# Patient Record
Sex: MALE | Race: WHITE | NOT HISPANIC OR LATINO | Employment: FULL TIME | URBAN - METROPOLITAN AREA
[De-identification: names, ages, dates, MRNs, and addresses within clinical notes are randomized per-mention and may not be internally consistent; named-entity substitution may affect disease eponyms.]

---

## 2023-04-09 DIAGNOSIS — Z72.0 TOBACCO USE: ICD-10-CM

## 2023-04-10 PROBLEM — M25.522 LEFT ELBOW PAIN: Status: ACTIVE | Noted: 2023-04-10

## 2023-04-10 PROBLEM — E53.8 VITAMIN B12 DEFICIENCY: Status: ACTIVE | Noted: 2023-04-10

## 2023-04-10 PROBLEM — E87.6 HYPOKALEMIA: Status: ACTIVE | Noted: 2023-04-10

## 2023-04-10 PROBLEM — R35.0 URINARY FREQUENCY: Status: ACTIVE | Noted: 2023-04-10

## 2023-04-10 PROBLEM — Z86.0101 HISTORY OF ADENOMATOUS POLYP OF COLON: Status: ACTIVE | Noted: 2023-04-10

## 2023-04-10 PROBLEM — G62.9 PERIPHERAL NEUROPATHY: Status: ACTIVE | Noted: 2023-04-10

## 2023-04-10 PROBLEM — M79.604 PAIN OF RIGHT LOWER EXTREMITY: Status: ACTIVE | Noted: 2023-04-10

## 2023-04-10 PROBLEM — F41.1 GENERALIZED ANXIETY DISORDER: Status: ACTIVE | Noted: 2023-04-10

## 2023-04-10 PROBLEM — M71.122 INFECTION OF LEFT OLECRANON BURSA: Status: RESOLVED | Noted: 2023-04-10 | Resolved: 2023-04-10

## 2023-04-10 PROBLEM — D10.9: Status: ACTIVE | Noted: 2023-04-10

## 2023-04-10 PROBLEM — D64.9 ANEMIA: Status: ACTIVE | Noted: 2023-04-10

## 2023-04-10 PROBLEM — M79.605 PAIN OF LEFT LOWER EXTREMITY: Status: ACTIVE | Noted: 2023-04-10

## 2023-04-10 PROBLEM — Z86.010 HISTORY OF ADENOMATOUS POLYP OF COLON: Status: ACTIVE | Noted: 2023-04-10

## 2023-04-10 PROBLEM — R42 LIGHTHEADEDNESS: Status: ACTIVE | Noted: 2023-04-10

## 2023-04-10 PROBLEM — D72.829 LEUKOCYTOSIS: Status: ACTIVE | Noted: 2023-04-10

## 2023-04-10 PROBLEM — R73.01 IMPAIRED FASTING GLUCOSE: Status: ACTIVE | Noted: 2023-04-10

## 2023-04-10 PROBLEM — K13.70 LESION OF UVULA: Status: ACTIVE | Noted: 2023-04-10

## 2023-04-10 PROBLEM — R20.0 NUMBNESS: Status: ACTIVE | Noted: 2023-04-10

## 2023-04-10 PROBLEM — I25.10 CAD (CORONARY ARTERY DISEASE): Status: ACTIVE | Noted: 2023-04-10

## 2023-04-10 PROBLEM — R91.8 LUNG NODULES: Status: ACTIVE | Noted: 2023-04-10

## 2023-04-10 PROBLEM — E07.9 THYROID DYSFUNCTION: Status: ACTIVE | Noted: 2023-04-10

## 2023-04-10 PROBLEM — R31.21 ASYMPTOMATIC MICROSCOPIC HEMATURIA: Status: ACTIVE | Noted: 2023-04-10

## 2023-04-10 PROBLEM — F41.9 ANXIETY: Status: ACTIVE | Noted: 2023-04-10

## 2023-04-10 PROBLEM — K63.5 COLON POLYP: Status: ACTIVE | Noted: 2023-04-10

## 2023-04-10 PROBLEM — R07.9 CHEST PAIN: Status: ACTIVE | Noted: 2023-04-10

## 2023-04-10 PROBLEM — N40.0 BPH (BENIGN PROSTATIC HYPERPLASIA): Status: ACTIVE | Noted: 2023-04-10

## 2023-04-10 RX ORDER — BUPROPION HYDROCHLORIDE 150 MG/1
150 TABLET ORAL DAILY
COMMUNITY
End: 2024-01-19 | Stop reason: ALTCHOICE

## 2023-04-10 RX ORDER — VARENICLINE TARTRATE 1 MG/1
1 TABLET, FILM COATED ORAL 2 TIMES DAILY
Qty: 56 TABLET | Refills: 1 | Status: SHIPPED | OUTPATIENT
Start: 2023-04-10 | End: 2023-05-01

## 2023-04-10 RX ORDER — BUPROPION HYDROCHLORIDE 150 MG/1
150 TABLET ORAL DAILY
Qty: 90 TABLET | Refills: 2 | Status: SHIPPED | OUTPATIENT
Start: 2023-04-10 | End: 2024-01-19 | Stop reason: ALTCHOICE

## 2023-04-10 RX ORDER — BUSPIRONE HYDROCHLORIDE 7.5 MG/1
7.5 TABLET ORAL 2 TIMES DAILY PRN
COMMUNITY
End: 2024-01-19 | Stop reason: ALTCHOICE

## 2023-04-10 RX ORDER — MULTIVIT-MIN/FOLIC/VIT K/LYCOP 400-300MCG
1 TABLET ORAL DAILY
COMMUNITY

## 2023-04-10 RX ORDER — PREGABALIN 300 MG/1
300 CAPSULE ORAL 2 TIMES DAILY
COMMUNITY
End: 2023-07-25

## 2023-04-10 RX ORDER — ROPINIROLE 0.5 MG/1
TABLET, FILM COATED ORAL
COMMUNITY
Start: 2021-11-23 | End: 2024-01-19 | Stop reason: ALTCHOICE

## 2023-04-10 RX ORDER — ESCITALOPRAM OXALATE 20 MG/1
20 TABLET ORAL DAILY
COMMUNITY
End: 2024-02-07

## 2023-04-10 RX ORDER — ROPINIROLE 1 MG/1
TABLET, FILM COATED ORAL
COMMUNITY
End: 2023-09-25

## 2023-04-30 DIAGNOSIS — Z72.0 TOBACCO USE: ICD-10-CM

## 2023-05-01 RX ORDER — PANTOPRAZOLE SODIUM 40 MG/1
40 TABLET, DELAYED RELEASE ORAL DAILY
COMMUNITY
Start: 2023-04-10 | End: 2024-01-19 | Stop reason: SDUPTHER

## 2023-05-01 RX ORDER — VARENICLINE TARTRATE 1 MG/1
1 TABLET, FILM COATED ORAL 2 TIMES DAILY
Qty: 56 TABLET | Refills: 1 | Status: SHIPPED | OUTPATIENT
Start: 2023-05-01 | End: 2023-05-30

## 2023-05-29 DIAGNOSIS — Z72.0 TOBACCO USE: ICD-10-CM

## 2023-05-30 RX ORDER — VARENICLINE TARTRATE 1 MG/1
1 TABLET, FILM COATED ORAL 2 TIMES DAILY
Qty: 56 TABLET | Refills: 1 | Status: SHIPPED | OUTPATIENT
Start: 2023-05-30 | End: 2023-06-28

## 2023-06-28 DIAGNOSIS — Z72.0 TOBACCO USE: ICD-10-CM

## 2023-06-28 RX ORDER — VARENICLINE TARTRATE 1 MG/1
1 TABLET, FILM COATED ORAL 2 TIMES DAILY
Qty: 56 TABLET | Refills: 1 | Status: SHIPPED | OUTPATIENT
Start: 2023-06-28 | End: 2023-07-19

## 2023-06-28 RX ORDER — SULINDAC 200 MG/1
200 TABLET ORAL 2 TIMES DAILY PRN
COMMUNITY
Start: 2022-08-27 | End: 2024-01-19 | Stop reason: ALTCHOICE

## 2023-07-19 DIAGNOSIS — Z72.0 TOBACCO USE: ICD-10-CM

## 2023-07-19 RX ORDER — VARENICLINE TARTRATE 1 MG/1
1 TABLET, FILM COATED ORAL 2 TIMES DAILY
Qty: 56 TABLET | Refills: 1 | Status: SHIPPED | OUTPATIENT
Start: 2023-07-19 | End: 2023-08-16

## 2023-07-25 ENCOUNTER — TELEPHONE (OUTPATIENT)
Dept: PRIMARY CARE | Facility: CLINIC | Age: 58
End: 2023-07-25
Payer: COMMERCIAL

## 2023-07-25 DIAGNOSIS — G62.9 POLYNEUROPATHY, UNSPECIFIED: ICD-10-CM

## 2023-07-25 RX ORDER — PREGABALIN 300 MG/1
300 CAPSULE ORAL 2 TIMES DAILY
Qty: 180 CAPSULE | Refills: 0 | Status: SHIPPED | OUTPATIENT
Start: 2023-07-25 | End: 2023-10-23 | Stop reason: SDUPTHER

## 2023-08-15 DIAGNOSIS — Z72.0 TOBACCO USE: ICD-10-CM

## 2023-08-16 RX ORDER — VARENICLINE TARTRATE 1 MG/1
1 TABLET, FILM COATED ORAL 2 TIMES DAILY
Qty: 56 TABLET | Refills: 1 | Status: SHIPPED | OUTPATIENT
Start: 2023-08-16 | End: 2023-09-11

## 2023-09-10 DIAGNOSIS — Z72.0 TOBACCO USE: ICD-10-CM

## 2023-09-11 RX ORDER — AMOXICILLIN 875 MG/1
TABLET, FILM COATED ORAL
COMMUNITY
Start: 2023-09-08 | End: 2023-12-05 | Stop reason: ALTCHOICE

## 2023-09-11 RX ORDER — VARENICLINE TARTRATE 1 MG/1
1 TABLET, FILM COATED ORAL 2 TIMES DAILY
Qty: 56 TABLET | Refills: 0 | Status: SHIPPED | OUTPATIENT
Start: 2023-09-11 | End: 2023-10-16

## 2023-09-11 RX ORDER — IBUPROFEN 800 MG/1
TABLET ORAL
COMMUNITY
Start: 2023-09-08 | End: 2024-01-19 | Stop reason: ALTCHOICE

## 2023-09-11 RX ORDER — TRIAZOLAM 0.25 MG/1
TABLET ORAL
COMMUNITY
Start: 2023-09-07 | End: 2024-01-19 | Stop reason: ALTCHOICE

## 2023-09-11 RX ORDER — CHLORHEXIDINE GLUCONATE ORAL RINSE 1.2 MG/ML
SOLUTION DENTAL
COMMUNITY
Start: 2023-09-08 | End: 2024-01-19 | Stop reason: ALTCHOICE

## 2023-09-24 DIAGNOSIS — G62.9 POLYNEUROPATHY, UNSPECIFIED: ICD-10-CM

## 2023-09-24 DIAGNOSIS — Z72.0 TOBACCO USE: ICD-10-CM

## 2023-09-25 RX ORDER — VARENICLINE TARTRATE 1 MG/1
1 TABLET, FILM COATED ORAL 2 TIMES DAILY
Qty: 56 TABLET | Refills: 0 | OUTPATIENT
Start: 2023-09-25

## 2023-09-25 RX ORDER — ROPINIROLE 1 MG/1
TABLET, FILM COATED ORAL
Qty: 90 TABLET | Refills: 1 | Status: SHIPPED | OUTPATIENT
Start: 2023-09-25 | End: 2023-10-06

## 2023-10-06 DIAGNOSIS — G62.9 POLYNEUROPATHY, UNSPECIFIED: ICD-10-CM

## 2023-10-06 RX ORDER — ROPINIROLE 1 MG/1
1 TABLET, FILM COATED ORAL NIGHTLY
Qty: 90 TABLET | Refills: 1 | Status: SHIPPED | OUTPATIENT
Start: 2023-10-06 | End: 2024-05-02 | Stop reason: SDUPTHER

## 2023-10-13 DIAGNOSIS — Z72.0 TOBACCO USE: ICD-10-CM

## 2023-10-16 RX ORDER — VARENICLINE TARTRATE 1 MG/1
1 TABLET, FILM COATED ORAL 2 TIMES DAILY
Qty: 56 TABLET | Refills: 1 | Status: SHIPPED | OUTPATIENT
Start: 2023-10-16 | End: 2024-01-19 | Stop reason: SDUPTHER

## 2023-10-20 DIAGNOSIS — G62.9 POLYNEUROPATHY, UNSPECIFIED: ICD-10-CM

## 2023-10-20 DIAGNOSIS — Z72.0 TOBACCO USE: ICD-10-CM

## 2023-10-23 RX ORDER — VARENICLINE TARTRATE 1 MG/1
1 TABLET, FILM COATED ORAL 2 TIMES DAILY
Qty: 168 TABLET | Refills: 1 | OUTPATIENT
Start: 2023-10-23

## 2023-10-23 RX ORDER — PREGABALIN 300 MG/1
300 CAPSULE ORAL 2 TIMES DAILY
Qty: 180 CAPSULE | Refills: 0 | Status: SHIPPED | OUTPATIENT
Start: 2023-10-23 | End: 2024-01-19 | Stop reason: SDUPTHER

## 2023-10-23 NOTE — TELEPHONE ENCOUNTER
Rx Refill Request Telephone Encounter    Name:  Camacho Thomas  :  [id number]  Medication Name:  pregabalin (Lyrica) 300 mg capsule TAKE 1 CAPSULE BY MOUTH TWICE A DAY - 30 day   PT is out!  Specific Pharmacy location:  [facility] (new)  Date of last appointment:  2023  Date of next appointment:  na  Best number to reach patient:  [phone]

## 2023-11-29 ENCOUNTER — TELEPHONE (OUTPATIENT)
Dept: PRIMARY CARE | Facility: CLINIC | Age: 58
End: 2023-11-29
Payer: COMMERCIAL

## 2023-11-29 DIAGNOSIS — F17.200 TOBACCO DEPENDENCE: ICD-10-CM

## 2023-12-01 RX ORDER — VARENICLINE TARTRATE 0.5 (11)-1
KIT ORAL 2 TIMES DAILY
COMMUNITY
End: 2023-12-01 | Stop reason: SDUPTHER

## 2023-12-01 NOTE — TELEPHONE ENCOUNTER
MEDICATION REFILL     New pharmacy for this Rx    Pharmacy Name:    [facility]  Medication requested            Varenicline  - starter  Dosage    starter dose   Quantity 30 or 90 days  [  ]   Allergies     nka  Date of last visit     Date of Next Appointment   12/7/2023

## 2023-12-02 RX ORDER — VARENICLINE TARTRATE 0.5 (11)-1
0.5 KIT ORAL 2 TIMES DAILY
Qty: 53 EACH | Refills: 1 | Status: SHIPPED | OUTPATIENT
Start: 2023-12-02 | End: 2024-01-19 | Stop reason: ALTCHOICE

## 2024-01-19 ENCOUNTER — OFFICE VISIT (OUTPATIENT)
Dept: PRIMARY CARE | Facility: CLINIC | Age: 59
End: 2024-01-19
Payer: COMMERCIAL

## 2024-01-19 VITALS
WEIGHT: 172 LBS | HEIGHT: 73 IN | SYSTOLIC BLOOD PRESSURE: 106 MMHG | TEMPERATURE: 97.3 F | HEART RATE: 71 BPM | DIASTOLIC BLOOD PRESSURE: 73 MMHG | BODY MASS INDEX: 22.8 KG/M2 | OXYGEN SATURATION: 95 %

## 2024-01-19 DIAGNOSIS — F17.200 TOBACCO USE DISORDER: ICD-10-CM

## 2024-01-19 DIAGNOSIS — F41.1 GENERALIZED ANXIETY DISORDER: ICD-10-CM

## 2024-01-19 DIAGNOSIS — E53.8 VITAMIN B12 DEFICIENCY: ICD-10-CM

## 2024-01-19 DIAGNOSIS — F17.210 CIGARETTE NICOTINE DEPENDENCE WITHOUT COMPLICATION: ICD-10-CM

## 2024-01-19 DIAGNOSIS — F41.9 ANXIETY: ICD-10-CM

## 2024-01-19 DIAGNOSIS — Z12.5 SCREENING FOR PROSTATE CANCER: ICD-10-CM

## 2024-01-19 DIAGNOSIS — G62.9 POLYNEUROPATHY, UNSPECIFIED: ICD-10-CM

## 2024-01-19 DIAGNOSIS — N40.1 BENIGN PROSTATIC HYPERPLASIA WITH URINARY FREQUENCY: ICD-10-CM

## 2024-01-19 DIAGNOSIS — I25.10 CORONARY ARTERY DISEASE INVOLVING NATIVE CORONARY ARTERY OF NATIVE HEART WITHOUT ANGINA PECTORIS: Primary | ICD-10-CM

## 2024-01-19 DIAGNOSIS — R91.8 LUNG NODULES: ICD-10-CM

## 2024-01-19 DIAGNOSIS — R35.0 BENIGN PROSTATIC HYPERPLASIA WITH URINARY FREQUENCY: ICD-10-CM

## 2024-01-19 DIAGNOSIS — Z72.0 TOBACCO USE: ICD-10-CM

## 2024-01-19 DIAGNOSIS — R12 HEARTBURN: ICD-10-CM

## 2024-01-19 DIAGNOSIS — Z79.899 MEDICATION MANAGEMENT: ICD-10-CM

## 2024-01-19 DIAGNOSIS — K21.9 GASTROESOPHAGEAL REFLUX DISEASE WITHOUT ESOPHAGITIS: ICD-10-CM

## 2024-01-19 PROCEDURE — 99407 BEHAV CHNG SMOKING > 10 MIN: CPT | Performed by: FAMILY MEDICINE

## 2024-01-19 PROCEDURE — 99214 OFFICE O/P EST MOD 30 MIN: CPT | Performed by: FAMILY MEDICINE

## 2024-01-19 PROCEDURE — G0296 VISIT TO DETERM LDCT ELIG: HCPCS | Performed by: FAMILY MEDICINE

## 2024-01-19 RX ORDER — PREGABALIN 300 MG/1
300 CAPSULE ORAL 2 TIMES DAILY
Qty: 180 CAPSULE | Refills: 1 | Status: SHIPPED | OUTPATIENT
Start: 2024-01-19

## 2024-01-19 RX ORDER — PREGABALIN 300 MG/1
300 CAPSULE ORAL 2 TIMES DAILY
Qty: 180 CAPSULE | Refills: 0 | Status: CANCELLED | OUTPATIENT
Start: 2024-01-19

## 2024-01-19 RX ORDER — PANTOPRAZOLE SODIUM 40 MG/1
40 TABLET, DELAYED RELEASE ORAL DAILY
Qty: 90 TABLET | Refills: 3 | Status: SHIPPED | OUTPATIENT
Start: 2024-01-19

## 2024-01-19 RX ORDER — VARENICLINE TARTRATE 1 MG/1
1 TABLET, FILM COATED ORAL 2 TIMES DAILY
Qty: 56 TABLET | Refills: 1 | Status: SHIPPED | OUTPATIENT
Start: 2024-01-19

## 2024-01-19 ASSESSMENT — ENCOUNTER SYMPTOMS: SHORTNESS OF BREATH: 0

## 2024-01-19 NOTE — PROGRESS NOTES
"Subjective   Patient ID: Camacho Thomas is a 58 y.o. male who presents for medication review. Doing well; states the Chantix has helped with the urge to smoke and has made his cigarettes taste bad; does fine if he is not thinking about it. Four year old Grandson is a huge motivator for pt to quit.      Working on quitting smoking  Chantix has helped  Down to half a pack per day       Anxiety: On Lexapro, controlling current symptoms  Sxs are ok      Peripheral neuropathy: On Lyrica  Does need meds       GERD  needs meds daily , has hiatal hernia     Restless legs ,  taking meds , doing ok with meds   Gets GERD with higher sxs     Had a catheterization in the past some coronary artery disease  Has not been on a statin         Review of Systems   Respiratory:  Negative for shortness of breath.    Cardiovascular:  Negative for chest pain.       Objective   /73   Pulse 71   Temp 36.3 °C (97.3 °F)   Ht 1.854 m (6' 1\")   Wt 78 kg (172 lb)   SpO2 95%   BMI 22.69 kg/m²     Physical Exam  Constitutional:       Appearance: Normal appearance. He is well-developed.   Cardiovascular:      Rate and Rhythm: Normal rate and regular rhythm.      Heart sounds: Normal heart sounds. No murmur heard.  Pulmonary:      Effort: Pulmonary effort is normal.      Breath sounds: Normal breath sounds.   Abdominal:      General: Abdomen is flat.      Palpations: Abdomen is soft.   Neurological:      General: No focal deficit present.      Mental Status: He is alert.   Psychiatric:         Mood and Affect: Mood normal.         Behavior: Behavior normal.         Assessment/Plan   Problem List Items Addressed This Visit             ICD-10-CM    Anxiety F41.9    Relevant Medications    pantoprazole (ProtoNix) 40 mg EC tablet    varenicline (Chantix) 1 mg tablet    Other Relevant Orders    CBC    Comprehensive Metabolic Panel    Lipid Panel    Prostate Specific Antigen, Screen    Drug Screen, Urine With Reflex to Confirmation    CT " lung screening low dose    BPH (benign prostatic hyperplasia) N40.0    Relevant Medications    pantoprazole (ProtoNix) 40 mg EC tablet    varenicline (Chantix) 1 mg tablet    Other Relevant Orders    CBC    Comprehensive Metabolic Panel    Lipid Panel    Prostate Specific Antigen, Screen    Drug Screen, Urine With Reflex to Confirmation    CT lung screening low dose    CAD (coronary artery disease) - Primary I25.10    Relevant Medications    pantoprazole (ProtoNix) 40 mg EC tablet    varenicline (Chantix) 1 mg tablet    Other Relevant Orders    CBC    Comprehensive Metabolic Panel    Lipid Panel    Prostate Specific Antigen, Screen    Drug Screen, Urine With Reflex to Confirmation    CT lung screening low dose    Generalized anxiety disorder F41.1    Relevant Medications    pantoprazole (ProtoNix) 40 mg EC tablet    varenicline (Chantix) 1 mg tablet    Other Relevant Orders    CBC    Comprehensive Metabolic Panel    Lipid Panel    Prostate Specific Antigen, Screen    Drug Screen, Urine With Reflex to Confirmation    CT lung screening low dose    Lung nodules R91.8    Relevant Medications    pantoprazole (ProtoNix) 40 mg EC tablet    varenicline (Chantix) 1 mg tablet    Other Relevant Orders    CBC    Comprehensive Metabolic Panel    Lipid Panel    Prostate Specific Antigen, Screen    Drug Screen, Urine With Reflex to Confirmation    CT lung screening low dose    Vitamin B12 deficiency E53.8    Relevant Medications    pantoprazole (ProtoNix) 40 mg EC tablet    varenicline (Chantix) 1 mg tablet    Other Relevant Orders    CBC    Comprehensive Metabolic Panel    Lipid Panel    Prostate Specific Antigen, Screen    Drug Screen, Urine With Reflex to Confirmation    CT lung screening low dose     Other Visit Diagnoses         Codes    Heartburn     R12    Relevant Medications    pantoprazole (ProtoNix) 40 mg EC tablet    varenicline (Chantix) 1 mg tablet    Other Relevant Orders    CBC    Comprehensive Metabolic Panel     Lipid Panel    Prostate Specific Antigen, Screen    Drug Screen, Urine With Reflex to Confirmation    CT lung screening low dose    Polyneuropathy, unspecified     G62.9    Relevant Medications    pantoprazole (ProtoNix) 40 mg EC tablet    varenicline (Chantix) 1 mg tablet    pregabalin (Lyrica) 300 mg capsule    Other Relevant Orders    CBC    Comprehensive Metabolic Panel    Lipid Panel    Prostate Specific Antigen, Screen    Drug Screen, Urine With Reflex to Confirmation    CT lung screening low dose    Tobacco use     Z72.0    Relevant Medications    pantoprazole (ProtoNix) 40 mg EC tablet    varenicline (Chantix) 1 mg tablet    Other Relevant Orders    CBC    Comprehensive Metabolic Panel    Lipid Panel    Prostate Specific Antigen, Screen    Drug Screen, Urine With Reflex to Confirmation    CT lung screening low dose    Gastroesophageal reflux disease without esophagitis     K21.9    Relevant Medications    pantoprazole (ProtoNix) 40 mg EC tablet    varenicline (Chantix) 1 mg tablet    Other Relevant Orders    CBC    Comprehensive Metabolic Panel    Lipid Panel    Prostate Specific Antigen, Screen    Drug Screen, Urine With Reflex to Confirmation    CT lung screening low dose    Screening for prostate cancer     Z12.5    Relevant Medications    pantoprazole (ProtoNix) 40 mg EC tablet    varenicline (Chantix) 1 mg tablet    Other Relevant Orders    CBC    Comprehensive Metabolic Panel    Lipid Panel    Prostate Specific Antigen, Screen    Drug Screen, Urine With Reflex to Confirmation    CT lung screening low dose    Medication management     Z79.899    Relevant Medications    pantoprazole (ProtoNix) 40 mg EC tablet    varenicline (Chantix) 1 mg tablet    Other Relevant Orders    CBC    Comprehensive Metabolic Panel    Lipid Panel    Prostate Specific Antigen, Screen    Drug Screen, Urine With Reflex to Confirmation    CT lung screening low dose    Tobacco use disorder     F17.200    Relevant Medications     pantoprazole (ProtoNix) 40 mg EC tablet    varenicline (Chantix) 1 mg tablet    Other Relevant Orders    CBC    Comprehensive Metabolic Panel    Lipid Panel    Prostate Specific Antigen, Screen    Drug Screen, Urine With Reflex to Confirmation    CT lung screening low dose    Cigarette nicotine dependence without complication     F17.210    Relevant Medications    pantoprazole (ProtoNix) 40 mg EC tablet    varenicline (Chantix) 1 mg tablet    Other Relevant Orders    CBC    Comprehensive Metabolic Panel    Lipid Panel    Prostate Specific Antigen, Screen    Drug Screen, Urine With Reflex to Confirmation    CT lung screening low dose

## 2024-01-19 NOTE — PATIENT INSTRUCTIONS
Get your blood work as ordered.  You should hear from our office with results whether they are normal are not within a few days.  Please call the office if you do not hear from us.     After you get testing done you will get notified from our office with regards to your results whether they are normal or not.  If you are registered in the electronic health record we will send you information in that system.  If you have any questions or need clarification please feel free to call the office.     GERD  Take prescribed medication as written.  May take TUMS or Rolaids as needed.  No eating within 2 hours of going to bed.  May want to elevate the head of your bed.  Avoid caffeine, chocolate, alcohol, spicy foods, acidic foods, fried foods, mint flavoring.  Call or return to the office if your symptoms change,  worsen, or fail to improve.  It may take 2 weeks for the medication to take effect.    CT screening:  I discussed the patient's smoking history and risk for lung cancer.  I discussed the CT scan for screening.  Including discussion of Harms and benefits of screening, follow-up diagnostic testing, and benefit of early detection.  The CAT scan of the lungs oftentimes will  cancer earlier and may result any better outcome than waiting for a lung cancer becomes symptomatic.  There are sometimes however people that do have false positive tests as well.  I gave the patient order for the CAT scan for screening purposes.  Determined the patient fits in the criteria based on the age range and smoking history to receive the CT        Coronary artery disease:  It is important that you continue your medications as prescribed.  You should be seeing a cardiologist regularly at least once a year sometimes more than that.  It is important that if you have problems with chest pain or shortness of breath that you let your cardiologist know immediately.  It is important to keep your weight down and exercise regularly as these  are good for your heart.  Also, if you are smoker stopped smoking as that increases your risk of heart disease.    The statin class of cholesterol drugs has been shown to have a significant benefit in reduction of heart disease.  The medications reduce the risk of heart attack by 30 % and the risk of death by about 20 %.  The problems with side effects are comparatively very low.  The risk of muscle pain is about 5% in statin use and the risk of liver damage is < 0.06%.  Therefore with these medications the benefit far outweighs the risk and I would recommend you take the medications.      It is however important to get your blood work checked periodically and report any significant increase risk in muscle pains, especially diffuse all over muscle pains.

## 2024-01-24 ENCOUNTER — APPOINTMENT (OUTPATIENT)
Dept: PRIMARY CARE | Facility: CLINIC | Age: 59
End: 2024-01-24
Payer: COMMERCIAL

## 2024-01-30 ENCOUNTER — OFFICE VISIT (OUTPATIENT)
Dept: SURGERY | Facility: CLINIC | Age: 59
End: 2024-01-30
Payer: COMMERCIAL

## 2024-01-30 VITALS
HEIGHT: 74 IN | HEART RATE: 54 BPM | OXYGEN SATURATION: 97 % | WEIGHT: 179 LBS | SYSTOLIC BLOOD PRESSURE: 118 MMHG | DIASTOLIC BLOOD PRESSURE: 79 MMHG | BODY MASS INDEX: 22.97 KG/M2

## 2024-01-30 DIAGNOSIS — R07.89 OTHER CHEST PAIN: Primary | ICD-10-CM

## 2024-01-30 PROCEDURE — 99213 OFFICE O/P EST LOW 20 MIN: CPT | Performed by: SURGERY

## 2024-01-30 NOTE — PROGRESS NOTES
General Surgery History and Physical    Referring Provider:  Karla Holcomb MD  No ref. provider found     Chief Complaint:  Chief Complaint   Patient presents with    New Patient Visit     Eval for hernia        History of Present Illness:  Camacho Thomas is a 58 y.o. male who presents for evaluation of hiatal hernia.   Patient has had on and off chest pain for several years   Is followed by cardiology   Had cardiac cath in July 2021 which was normal per report.   Recently, chest pain recurred and saw Cardiology, who suggested his hiatal hernia needs to be addressed. He was started on protonix 40mg dialy by PCP Dr Holcomb.   Per patient, he had EGD before. He was told that he has HH.   Clinically, he denies heart burn. His chest pain is more like pressure and tightness.   He denies dysphagia or regurgitation.     He had EGD with Dr Araya in 2016, which showed no Raya's on biopsy.   No other EGD or contrast study of esophagus was found in system.     Past Medical History:  Past Medical History:   Diagnosis Date    Other specified health status     No pertinent past medical history    Personal history of other diseases of the circulatory system 05/29/2019    History of mitral valve prolapse        Past Surgical History:  No past surgical history on file.     Medications:  Current Outpatient Medications   Medication Instructions    escitalopram (LEXAPRO) 20 mg, oral, Daily    multivit-min-folic-vit K-lycop (One-A-Day Men's Multivitamin) 400- mcg tablet 1 tablet, oral, Daily    pantoprazole (PROTONIX) 40 mg, oral, Daily    pregabalin (LYRICA) 300 mg, oral, 2 times daily    rOPINIRole (REQUIP) 1 mg, oral, Nightly    varenicline (CHANTIX) 1 mg, oral, 2 times daily        Allergies:  No Known Allergies     Family History:  No family history on file.     Social History:  Social History     Socioeconomic History    Marital status:      Spouse name: Not on file    Number of children: Not on file     Years of education: Not on file    Highest education level: Not on file   Occupational History    Not on file   Tobacco Use    Smoking status: Every Day     Packs/day: .5     Types: Cigarettes    Smokeless tobacco: Never   Substance and Sexual Activity    Alcohol use: Not on file    Drug use: Not on file    Sexual activity: Not on file   Other Topics Concern    Not on file   Social History Narrative    Not on file     Social Determinants of Health     Financial Resource Strain: Not on file   Food Insecurity: Not on file   Transportation Needs: Not on file   Physical Activity: Not on file   Stress: Not on file   Social Connections: Not on file   Intimate Partner Violence: Not on file   Housing Stability: Not on file        Review of Systems:  A complete 12 point review of systems was performed. Please see scanned questionnaire and is otherwise negative except as noted in the history of present illness.    Vital Signs:  Vitals:    01/30/24 1317   BP: 118/79   Pulse: 54   SpO2: 97%      Body mass index is 22.98 kg/m².      Physical Exam:  General: No acute distress.   Neuro: Alert and oriented ×3. Follows commands.  Face: Atraumatic, no visible skin lesion.   Head: Atraumatic  Eyes: Pupils equal reactive to light. Extraocular motions intact.  Ears: Hears normal speaking voice.  Mouth, Nose, Throat: Mucous membranes moist.  Normal dentition.  Neck: Supple. No visible masses.  Breast: Not examined.   Lung: Patent airway and no labored breath.   Vascular: Palpable radial pulses bilaterally.  Abdomen: Soft, NT,ND.  Small UH.   Rectal and Perianal: Not examined.   Genitourinary: Not examined.   Musculoskeletal: Moves all extremities.  Normal range of motion.  Extremities: No cyanosis. No edema.   Lymphatic: No palpable lymph nodes.  Skin: No rashes or lesions.  Psychological: Normal affect      Laboratory Values:  CBC:   Lab Results   Component Value Date    WBC 8.9 09/20/2021    RBC 3.85 (L) 09/20/2021    HGB 12.3 (L)  09/20/2021    HCT 34.2 (L) 09/20/2021     09/20/2021       RFP:   Lab Results   Component Value Date     09/20/2021    K 3.7 09/20/2021     09/20/2021    CO2 27 09/20/2021    BUN 11 09/20/2021    CREATININE 0.95 09/20/2021    CALCIUM 9.0 09/20/2021    MG 1.99 09/20/2021        LFTs:   Lab Results   Component Value Date    PROT 6.6 09/20/2021    ALBUMIN 4.1 09/20/2021    BILITOT 0.9 09/20/2021    ALKPHOS 43 09/20/2021    AST 22 09/20/2021    ALT 15 09/20/2021            Imaging:  I have personally reviewed the images and the radiologist's report.  No results found.      Assessment:  This is a 58 y.o. male who presents for evaluation of HH   History of chronic chest pain   Cardiology believe his HH contributes to his complaint   However, I dont have EGD or contrast study records to confirm the diagnosis     Plan:  Patient is scheduled for have low dose CT chest for lung screening   Explained to patient, I will review his CT after it is done to see if concern for HH. Further imaging or study such as esophagram or EGD may be needed.  Instructed to continue PPI for now.   Call or ED if any concern       CT chest was done on 2/13. I dont see obvious large HH. Called and wife answered. Per wife, patient still has chest pain especially during night, which likely related to reflux. Recommended to get EGD. Patient will call office to schedule procedure.   2/22/24.       Melba Molina MD [facility]  General Surgery  Office: [phone]  Fax:     [phone]  3:44 PM   01/30/24

## 2024-01-30 NOTE — LETTER
January 30, 2024     Karla Holcomb MD  [address]  [address]  [facility] [address]    Patient: Camacho Thomas   YOB: 1965   Date of Visit: 1/30/2024       Dear Dr. Karla Holcomb MD:    Thank you for referring Camacho Thomas to me for evaluation. Below are my notes for this consultation.  If you have questions, please do not hesitate to call me. I look forward to following your patient along with you.       Sincerely,     Melba Molina MD      CC: No Recipients  ______________________________________________________________________________________    General Surgery History and Physical    Referring Provider:  Karla Holcmob MD  No ref. provider found     Chief Complaint:  Chief Complaint   Patient presents with   • New Patient Visit     Eval for hernia        History of Present Illness:  Camacho Thomas is a 58 y.o. male who presents for evaluation of hiatal hernia.   Patient has had on and off chest pain for several years   Is followed by cardiology   Had cardiac cath in July 2021 which was normal per report.   Recently, chest pain recurred and saw Cardiology, who suggested his hiatal hernia needs to be addressed. He was started on protonix 40mg dialy by PCP Dr Holcomb.   Per patient, he had EGD before. He was told that he has HH.   Clinically, he denies heart burn. His chest pain is more like pressure and tightness.   He denies dysphagia or regurgitation.     He had EGD with Dr Araya in 2016, which showed no Raya's on biopsy.   No other EGD or contrast study of esophagus was found in system.     Past Medical History:  Past Medical History:   Diagnosis Date   • Other specified health status     No pertinent past medical history   • Personal history of other diseases of the circulatory system 05/29/2019    History of mitral valve prolapse        Past Surgical History:  No past surgical history on file.     Medications:  Current Outpatient Medications   Medication Instructions   •  escitalopram (LEXAPRO) 20 mg, oral, Daily   • multivit-min-folic-vit K-lycop (One-A-Day Men's Multivitamin) 400- mcg tablet 1 tablet, oral, Daily   • pantoprazole (PROTONIX) 40 mg, oral, Daily   • pregabalin (LYRICA) 300 mg, oral, 2 times daily   • rOPINIRole (REQUIP) 1 mg, oral, Nightly   • varenicline (CHANTIX) 1 mg, oral, 2 times daily        Allergies:  No Known Allergies     Family History:  No family history on file.     Social History:  Social History     Socioeconomic History   • Marital status:      Spouse name: Not on file   • Number of children: Not on file   • Years of education: Not on file   • Highest education level: Not on file   Occupational History   • Not on file   Tobacco Use   • Smoking status: Every Day     Packs/day: .5     Types: Cigarettes   • Smokeless tobacco: Never   Substance and Sexual Activity   • Alcohol use: Not on file   • Drug use: Not on file   • Sexual activity: Not on file   Other Topics Concern   • Not on file   Social History Narrative   • Not on file     Social Determinants of Health     Financial Resource Strain: Not on file   Food Insecurity: Not on file   Transportation Needs: Not on file   Physical Activity: Not on file   Stress: Not on file   Social Connections: Not on file   Intimate Partner Violence: Not on file   Housing Stability: Not on file        Review of Systems:  A complete 12 point review of systems was performed. Please see scanned questionnaire and is otherwise negative except as noted in the history of present illness.    Vital Signs:  Vitals:    01/30/24 1317   BP: 118/79   Pulse: 54   SpO2: 97%      Body mass index is 22.98 kg/m².      Physical Exam:  General: No acute distress.   Neuro: Alert and oriented ×3. Follows commands.  Face: Atraumatic, no visible skin lesion.   Head: Atraumatic  Eyes: Pupils equal reactive to light. Extraocular motions intact.  Ears: Hears normal speaking voice.  Mouth, Nose, Throat: Mucous membranes moist.  Normal  dentition.  Neck: Supple. No visible masses.  Breast: Not examined.   Lung: Patent airway and no labored breath.   Vascular: Palpable radial pulses bilaterally.  Abdomen: Soft, NT,ND.  Small UH.   Rectal and Perianal: Not examined.   Genitourinary: Not examined.   Musculoskeletal: Moves all extremities.  Normal range of motion.  Extremities: No cyanosis. No edema.   Lymphatic: No palpable lymph nodes.  Skin: No rashes or lesions.  Psychological: Normal affect      Laboratory Values:  CBC:   Lab Results   Component Value Date    WBC 8.9 09/20/2021    RBC 3.85 (L) 09/20/2021    HGB 12.3 (L) 09/20/2021    HCT 34.2 (L) 09/20/2021     09/20/2021       RFP:   Lab Results   Component Value Date     09/20/2021    K 3.7 09/20/2021     09/20/2021    CO2 27 09/20/2021    BUN 11 09/20/2021    CREATININE 0.95 09/20/2021    CALCIUM 9.0 09/20/2021    MG 1.99 09/20/2021        LFTs:   Lab Results   Component Value Date    PROT 6.6 09/20/2021    ALBUMIN 4.1 09/20/2021    BILITOT 0.9 09/20/2021    ALKPHOS 43 09/20/2021    AST 22 09/20/2021    ALT 15 09/20/2021            Imaging:  I have personally reviewed the images and the radiologist's report.  No results found.      Assessment:  This is a 58 y.o. male who presents for evaluation of HH   History of chronic chest pain   Cardiology believe his HH contributes to his complaint   However, I dont have EGD or contrast study records to confirm the diagnosis     Plan:  Patient is scheduled for have low dose CT chest for lung screening   Explained to patient, I will review his CT after it is done to see if concern for HH. Further imaging or study such as esophagram or EGD may be needed.  Instructed to continue PPI for now.   Call or ED if any concern       Melba Molina MD [facility]  General Surgery  Office: [phone]  Fax:     [phone]  3:44 PM   01/30/24

## 2024-02-07 DIAGNOSIS — F41.9 ANXIETY: Primary | ICD-10-CM

## 2024-02-07 PROBLEM — G25.81 RESTLESS LEGS SYNDROME: Status: ACTIVE | Noted: 2024-02-07

## 2024-02-07 PROBLEM — I34.1 MITRAL VALVE PROLAPSE: Status: ACTIVE | Noted: 2024-02-07

## 2024-02-07 PROBLEM — J44.9 MILD CHRONIC OBSTRUCTIVE PULMONARY DISEASE (MULTI): Status: ACTIVE | Noted: 2023-02-13

## 2024-02-07 PROBLEM — I21.9 MYOCARDIAL INFARCTION (MULTI): Status: ACTIVE | Noted: 2024-02-07

## 2024-02-07 PROBLEM — N62 HYPERTROPHY OF BREAST: Status: ACTIVE | Noted: 2023-02-07

## 2024-02-07 PROBLEM — F17.210 CIGARETTE SMOKER: Status: ACTIVE | Noted: 2023-02-13

## 2024-02-07 PROBLEM — R22.31 MASS OF RIGHT AXILLA: Status: ACTIVE | Noted: 2024-02-07

## 2024-02-07 RX ORDER — ESCITALOPRAM OXALATE 20 MG/1
20 TABLET ORAL DAILY
Qty: 90 TABLET | Refills: 1 | Status: SHIPPED | OUTPATIENT
Start: 2024-02-07

## 2024-02-12 ENCOUNTER — HOSPITAL ENCOUNTER (OUTPATIENT)
Dept: RADIOLOGY | Facility: HOSPITAL | Age: 59
Discharge: HOME | End: 2024-02-12
Payer: COMMERCIAL

## 2024-02-12 DIAGNOSIS — F17.210 CIGARETTE NICOTINE DEPENDENCE WITHOUT COMPLICATION: ICD-10-CM

## 2024-02-12 DIAGNOSIS — Z72.0 TOBACCO USE: ICD-10-CM

## 2024-02-12 DIAGNOSIS — E53.8 VITAMIN B12 DEFICIENCY: ICD-10-CM

## 2024-02-12 DIAGNOSIS — I25.10 CORONARY ARTERY DISEASE INVOLVING NATIVE CORONARY ARTERY OF NATIVE HEART WITHOUT ANGINA PECTORIS: ICD-10-CM

## 2024-02-12 DIAGNOSIS — Z79.899 MEDICATION MANAGEMENT: ICD-10-CM

## 2024-02-12 DIAGNOSIS — F41.1 GENERALIZED ANXIETY DISORDER: ICD-10-CM

## 2024-02-12 DIAGNOSIS — Z12.5 SCREENING FOR PROSTATE CANCER: ICD-10-CM

## 2024-02-12 DIAGNOSIS — R35.0 BENIGN PROSTATIC HYPERPLASIA WITH URINARY FREQUENCY: ICD-10-CM

## 2024-02-12 DIAGNOSIS — F41.9 ANXIETY: ICD-10-CM

## 2024-02-12 DIAGNOSIS — N40.1 BENIGN PROSTATIC HYPERPLASIA WITH URINARY FREQUENCY: ICD-10-CM

## 2024-02-12 DIAGNOSIS — G62.9 POLYNEUROPATHY, UNSPECIFIED: ICD-10-CM

## 2024-02-12 DIAGNOSIS — F17.200 TOBACCO USE DISORDER: ICD-10-CM

## 2024-02-12 DIAGNOSIS — R91.8 LUNG NODULES: ICD-10-CM

## 2024-02-12 DIAGNOSIS — R12 HEARTBURN: ICD-10-CM

## 2024-02-12 DIAGNOSIS — K21.9 GASTROESOPHAGEAL REFLUX DISEASE WITHOUT ESOPHAGITIS: ICD-10-CM

## 2024-02-12 PROCEDURE — 71271 CT THORAX LUNG CANCER SCR C-: CPT

## 2024-02-13 ENCOUNTER — TELEPHONE (OUTPATIENT)
Dept: PRIMARY CARE | Facility: CLINIC | Age: 59
End: 2024-02-13
Payer: COMMERCIAL

## 2024-02-13 NOTE — TELEPHONE ENCOUNTER
----- Message from Karla Holcomb MD sent at 2/13/2024 11:02 AM EST -----  Lung CT shows stable small benign-appearing nodules, need to quit smoking, repeat 1 year  Does show coronary atherosclerosis, recommend getting blood work as ordered and starting statin medication to reduce your risk of heart attack

## 2024-02-13 NOTE — TELEPHONE ENCOUNTER
Result Communication    Resulted Orders   CT lung screening low dose    Narrative    Interpreted By:  Hernando Barroso,   STUDY:  CT LUNG SCREENING LOW DOSE;  2/12/2024 5:17 pm      INDICATION:  Signs/Symptoms:screen.      COMPARISON:  02/13/2023      ACCESSION NUMBER(S):  [id number]      ORDERING CLINICIAN:  RADHA MATOS      TECHNIQUE:  Helical data acquisition of the chest was obtained without IV  contrast material.  Images were reformatted in axial, coronal, and  sagittal planes.      FINDINGS:  LUNGS AND AIRWAYS:  The trachea and central airways are patent. No endobronchial lesion  is seen.      There is mild paraseptal emphysematous changes with biapical pleural  thickening.There is no focal consolidation, pleural effusion, or  pneumothorax.      There are several subcentimeter parenchymal lung nodules.  There is a stable 4 mm right middle lobe lung nodules. A  There is stable 5 mm right lower lobe parenchymal lung nodule.      There are no suspicious lung nodules.      MEDIASTINUM AND FARZANEH, LOWER NECK AND AXILLA:  The thyroid gland is unremarkable.  No evidence of thoracic lymphadenopathy by CT criteria.  Esophagus appears within normal limits as seen.      HEART AND VESSELS:  The thoracic aorta normal in course and caliber.  Main pulmonary artery and its branches are normal in caliber.  There is moderate coronary artery calcifications.  The cardiac chambers are not enlarged.  There is no pericardial effusion seen.      UPPER ABDOMEN:  The visualized subdiaphragmatic structures demonstrate no remarkable  findings.              CHEST WALL AND OSSEOUS STRUCTURES:  Chest wall is within normal limits.  No acute osseous pathology.There are no suspicious osseous lesions.        Impression    1. There are stable subcentimeter parenchymal lung nodules. Recommend  continued 1 year low-dose chest CT for surveillance.  2.  Mild-to-moderate coronary artery calcifications.          LUNG RADS CATEGORY:  Lung Rad:  Lung-RADS 2 (Benign Appearance or Indolent Behavior)      Recommendation: Continue annual screening with Low Dose Chest CT in  12 months, recommended as per American College [employer]  Guidelines Lung-RADS Version 2022.          MACRO:  None      Signed by: Hernando Barroso 2/13/2024 9:10 AM  Dictation workstation:   [id number]       12:51 PM  Karla Holcomb MD  [facility] [address] Clinical Support Staff  Lung CT shows stable small benign-appearing nodules, need to quit smoking, repeat 1 year  Does show coronary atherosclerosis, recommend getting blood work as ordered and starting statin medication to reduce your risk of heart attack    LVM. CA

## 2024-03-05 ENCOUNTER — APPOINTMENT (OUTPATIENT)
Dept: SURGERY | Facility: CLINIC | Age: 59
End: 2024-03-05
Payer: COMMERCIAL

## 2024-05-02 ENCOUNTER — TELEPHONE (OUTPATIENT)
Dept: PRIMARY CARE | Facility: CLINIC | Age: 59
End: 2024-05-02
Payer: COMMERCIAL

## 2024-05-02 DIAGNOSIS — G62.9 POLYNEUROPATHY, UNSPECIFIED: ICD-10-CM

## 2024-05-02 RX ORDER — ROPINIROLE 1 MG/1
1 TABLET, FILM COATED ORAL NIGHTLY
Qty: 90 TABLET | Refills: 2 | Status: SHIPPED | OUTPATIENT
Start: 2024-05-02

## 2024-05-02 NOTE — TELEPHONE ENCOUNTER
MEDICATION REFILL    Pt states the last few refills were for 1 mg and his previous dose was 2 mg.  He has continued to take the 2 mg and is running out of the Rx in half the time.  Was there a dose change?  Why if yes?    Pharmacy Name:    [facility]  Medication requested             Ropinirole 2 mg tablet   Dosage     1x daily at bedtime  Quantity      30 days   Allergies    none given  Date of last visit    01/19/2024  Date of Next Appointment [  ]

## 2024-06-13 ENCOUNTER — TELEPHONE (OUTPATIENT)
Dept: PRIMARY CARE | Facility: CLINIC | Age: 59
End: 2024-06-13
Payer: COMMERCIAL

## 2024-06-13 DIAGNOSIS — G62.9 POLYNEUROPATHY, UNSPECIFIED: ICD-10-CM

## 2024-06-13 RX ORDER — ROPINIROLE 1 MG/1
TABLET, FILM COATED ORAL
Qty: 180 TABLET | Refills: 1 | Status: SHIPPED | OUTPATIENT
Start: 2024-06-13

## 2024-06-13 NOTE — TELEPHONE ENCOUNTER
MD Princess Yousif MA  Caller: Unspecified (Today,  8:20 AM)  done          Previous Messages    Medication Question (REQUESTING UPDATE TO INSTRUCTIONS)  (Newest Message First)  View All Conversations on this Encounter  Karla Holcomb MD  You58 minutes ago (9:43 AM)       done     You routed conversation to Karla Holcomb MD1 hour ago (9:20 AM)     Bonnie Bourgeois routed conversation to Do [address] Clinical Support Staff2 hours ago (8:23 AM)     Bonnie Bourgeois2 hours ago (8:23 AM)     VG  Pt stated he is only getting 30 ropinirole at a time, but that the Rx is supposed to be 1 - 2 tabs daily as needed. Can an Rx be sent to show this increase of dose so they can fill for more than 30 at a time? (  / [facility])         Note         Camacho Thomas [phone]  Bonnie Bourgeois2 hours ago (8:20 AM)

## 2024-06-13 NOTE — TELEPHONE ENCOUNTER
Pt stated he is only getting 30 ropinirole at a time, but that the Rx is supposed to be 1 - 2 tabs daily as needed. Can an Rx be sent to show this increase of dose so they can fill for more than 30 at a time? (  / [facility])

## 2024-07-19 DIAGNOSIS — G62.9 POLYNEUROPATHY, UNSPECIFIED: ICD-10-CM

## 2024-07-22 RX ORDER — PREGABALIN 300 MG/1
300 CAPSULE ORAL 2 TIMES DAILY
Qty: 180 CAPSULE | Refills: 0 | Status: SHIPPED | OUTPATIENT
Start: 2024-07-22

## 2024-07-22 NOTE — TELEPHONE ENCOUNTER
Rx Refill Request Telephone Encounter    Name:  Camacho Thomas  :  [id number]  Medication Name:  pregabalin (Lyrica) 300 mg capsule Take 1 capsule (300 mg) by mouth 2 times a day. - 90 day    Specific Pharmacy location:  [facility]  Date of last appointment:  2024  Date of next appointment:  na  Best number to reach patient:  [phone]

## 2024-08-06 DIAGNOSIS — F41.9 ANXIETY: ICD-10-CM

## 2024-08-06 RX ORDER — ESCITALOPRAM OXALATE 20 MG/1
20 TABLET ORAL DAILY
Qty: 30 TABLET | Refills: 0 | Status: SHIPPED | OUTPATIENT
Start: 2024-08-06

## 2024-09-06 DIAGNOSIS — F41.9 ANXIETY: ICD-10-CM

## 2024-09-06 RX ORDER — ESCITALOPRAM OXALATE 20 MG/1
20 TABLET ORAL DAILY
Qty: 90 TABLET | Refills: 0 | Status: SHIPPED | OUTPATIENT
Start: 2024-09-06

## 2024-10-10 DIAGNOSIS — G62.9 POLYNEUROPATHY, UNSPECIFIED: ICD-10-CM

## 2024-10-11 RX ORDER — ROPINIROLE 1 MG/1
TABLET, FILM COATED ORAL
Qty: 90 TABLET | Refills: 0 | Status: SHIPPED | OUTPATIENT
Start: 2024-10-11

## 2024-10-17 DIAGNOSIS — G62.9 POLYNEUROPATHY, UNSPECIFIED: ICD-10-CM

## 2024-10-17 NOTE — TELEPHONE ENCOUNTER
Medication Refill Request:       Medication: pregablin 300mg capsule  1 po 2 x day     Qty: 180      LOV: 1/19/2024    NOV: 10/28/2024      Pharmacy: [facility]     Patient has 2 left

## 2024-10-18 RX ORDER — PREGABALIN 300 MG/1
300 CAPSULE ORAL 2 TIMES DAILY
Qty: 180 CAPSULE | Refills: 0 | Status: SHIPPED | OUTPATIENT
Start: 2024-10-18

## 2024-10-28 ENCOUNTER — APPOINTMENT (OUTPATIENT)
Dept: PRIMARY CARE | Facility: CLINIC | Age: 59
End: 2024-10-28
Payer: COMMERCIAL

## 2024-10-28 VITALS
OXYGEN SATURATION: 96 % | BODY MASS INDEX: 23.65 KG/M2 | WEIGHT: 184.31 LBS | HEIGHT: 74 IN | TEMPERATURE: 98 F | DIASTOLIC BLOOD PRESSURE: 71 MMHG | SYSTOLIC BLOOD PRESSURE: 117 MMHG

## 2024-10-28 DIAGNOSIS — K21.9 GASTROESOPHAGEAL REFLUX DISEASE WITHOUT ESOPHAGITIS: ICD-10-CM

## 2024-10-28 DIAGNOSIS — N40.1 BENIGN PROSTATIC HYPERPLASIA WITH URINARY FREQUENCY: ICD-10-CM

## 2024-10-28 DIAGNOSIS — G62.9 POLYNEUROPATHY, UNSPECIFIED: ICD-10-CM

## 2024-10-28 DIAGNOSIS — R91.8 LUNG NODULES: ICD-10-CM

## 2024-10-28 DIAGNOSIS — I25.10 CORONARY ARTERY DISEASE INVOLVING NATIVE CORONARY ARTERY OF NATIVE HEART WITHOUT ANGINA PECTORIS: ICD-10-CM

## 2024-10-28 DIAGNOSIS — E53.8 VITAMIN B12 DEFICIENCY: ICD-10-CM

## 2024-10-28 DIAGNOSIS — Z12.5 SCREENING FOR PROSTATE CANCER: ICD-10-CM

## 2024-10-28 DIAGNOSIS — J44.9 MILD CHRONIC OBSTRUCTIVE PULMONARY DISEASE (MULTI): ICD-10-CM

## 2024-10-28 DIAGNOSIS — L84 CORN OF FOOT: ICD-10-CM

## 2024-10-28 DIAGNOSIS — F17.210 CIGARETTE NICOTINE DEPENDENCE WITHOUT COMPLICATION: ICD-10-CM

## 2024-10-28 DIAGNOSIS — R35.0 BENIGN PROSTATIC HYPERPLASIA WITH URINARY FREQUENCY: ICD-10-CM

## 2024-10-28 DIAGNOSIS — F41.9 ANXIETY: Primary | ICD-10-CM

## 2024-10-28 DIAGNOSIS — F41.1 GENERALIZED ANXIETY DISORDER: ICD-10-CM

## 2024-10-28 PROBLEM — M79.605 PAIN OF LEFT LOWER EXTREMITY: Status: RESOLVED | Noted: 2023-04-10 | Resolved: 2024-10-28

## 2024-10-28 PROBLEM — M25.522 LEFT ELBOW PAIN: Status: RESOLVED | Noted: 2023-04-10 | Resolved: 2024-10-28

## 2024-10-28 PROBLEM — R22.31 MASS OF RIGHT AXILLA: Status: RESOLVED | Noted: 2024-02-07 | Resolved: 2024-10-28

## 2024-10-28 PROBLEM — R07.9 CHEST PAIN: Status: RESOLVED | Noted: 2023-04-10 | Resolved: 2024-10-28

## 2024-10-28 PROBLEM — I21.9 MYOCARDIAL INFARCTION (MULTI): Status: RESOLVED | Noted: 2024-02-07 | Resolved: 2024-10-28

## 2024-10-28 PROBLEM — R20.0 NUMBNESS: Status: RESOLVED | Noted: 2023-04-10 | Resolved: 2024-10-28

## 2024-10-28 PROBLEM — M79.604 PAIN OF RIGHT LOWER EXTREMITY: Status: RESOLVED | Noted: 2023-04-10 | Resolved: 2024-10-28

## 2024-10-28 PROCEDURE — 99214 OFFICE O/P EST MOD 30 MIN: CPT | Performed by: FAMILY MEDICINE

## 2024-10-28 PROCEDURE — 3008F BODY MASS INDEX DOCD: CPT | Performed by: FAMILY MEDICINE

## 2024-10-28 PROCEDURE — 99407 BEHAV CHNG SMOKING > 10 MIN: CPT | Performed by: FAMILY MEDICINE

## 2024-10-28 PROCEDURE — 4004F PT TOBACCO SCREEN RCVD TLK: CPT | Performed by: FAMILY MEDICINE

## 2024-10-28 RX ORDER — ESCITALOPRAM OXALATE 20 MG/1
20 TABLET ORAL DAILY
Qty: 90 TABLET | Refills: 3 | Status: SHIPPED | OUTPATIENT
Start: 2024-10-28

## 2024-10-28 RX ORDER — VARENICLINE TARTRATE 1 MG/1
1 TABLET, FILM COATED ORAL 2 TIMES DAILY
Qty: 56 TABLET | Refills: 1 | Status: SHIPPED | OUTPATIENT
Start: 2024-10-28

## 2024-10-28 RX ORDER — ROPINIROLE 1 MG/1
TABLET, FILM COATED ORAL
Qty: 90 TABLET | Refills: 3 | Status: SHIPPED | OUTPATIENT
Start: 2024-10-28

## 2024-10-28 RX ORDER — PREGABALIN 300 MG/1
300 CAPSULE ORAL 2 TIMES DAILY
Qty: 180 CAPSULE | Refills: 1 | Status: CANCELLED | OUTPATIENT
Start: 2024-10-28

## 2024-10-28 RX ORDER — PANTOPRAZOLE SODIUM 40 MG/1
40 TABLET, DELAYED RELEASE ORAL DAILY
Qty: 90 TABLET | Refills: 3 | Status: SHIPPED | OUTPATIENT
Start: 2024-10-28

## 2024-10-28 ASSESSMENT — PATIENT HEALTH QUESTIONNAIRE - PHQ9
SUM OF ALL RESPONSES TO PHQ9 QUESTIONS 1 AND 2: 0
2. FEELING DOWN, DEPRESSED OR HOPELESS: NOT AT ALL
1. LITTLE INTEREST OR PLEASURE IN DOING THINGS: NOT AT ALL

## 2024-11-27 DIAGNOSIS — F17.210 CIGARETTE NICOTINE DEPENDENCE WITHOUT COMPLICATION: ICD-10-CM

## 2024-12-01 RX ORDER — VARENICLINE TARTRATE 1 MG/1
1 TABLET, FILM COATED ORAL 2 TIMES DAILY
Qty: 180 TABLET | Refills: 0 | Status: SHIPPED | OUTPATIENT
Start: 2024-12-01

## 2024-12-14 DIAGNOSIS — G62.9 POLYNEUROPATHY, UNSPECIFIED: ICD-10-CM

## 2024-12-16 RX ORDER — ROPINIROLE 1 MG/1
TABLET, FILM COATED ORAL
Qty: 180 TABLET | Refills: 2 | Status: SHIPPED | OUTPATIENT
Start: 2024-12-16

## 2025-01-20 ENCOUNTER — TELEPHONE (OUTPATIENT)
Dept: PRIMARY CARE | Facility: CLINIC | Age: 60
End: 2025-01-20
Payer: COMMERCIAL

## 2025-01-20 DIAGNOSIS — G62.9 POLYNEUROPATHY, UNSPECIFIED: ICD-10-CM

## 2025-01-20 NOTE — TELEPHONE ENCOUNTER
MEDICATION REFILL    Pharmacy Name:    [facility]  Medication requested            Pregabalin    300 mg   Dosage    1 cap 2 x daily  Quantity     90 days    Allergies    none given  Date of last visit   10/28/2024  Date of Next Appointment   04/28/2025

## 2025-01-22 RX ORDER — PREGABALIN 300 MG/1
300 CAPSULE ORAL 2 TIMES DAILY
Qty: 180 CAPSULE | Refills: 0 | Status: SHIPPED | OUTPATIENT
Start: 2025-01-22

## 2025-02-11 ENCOUNTER — TELEMEDICINE (OUTPATIENT)
Dept: PRIMARY CARE | Facility: CLINIC | Age: 60
End: 2025-02-11
Payer: COMMERCIAL

## 2025-02-11 DIAGNOSIS — J06.9 VIRAL UPPER RESPIRATORY TRACT INFECTION: Primary | ICD-10-CM

## 2025-02-11 PROCEDURE — 99214 OFFICE O/P EST MOD 30 MIN: CPT | Performed by: NURSE PRACTITIONER

## 2025-02-11 RX ORDER — PSEUDOEPHEDRINE HYDROCHLORIDE 60 MG/1
60 TABLET ORAL EVERY 6 HOURS PRN
Qty: 12 TABLET | Refills: 0 | Status: SHIPPED | OUTPATIENT
Start: 2025-02-11 | End: 2025-02-14

## 2025-02-11 ASSESSMENT — ENCOUNTER SYMPTOMS
DYSURIA: 0
NAUSEA: 0
HEADACHES: 0
VOMITING: 0
WHEEZING: 0
DIARRHEA: 0
SINUS PAIN: 0
COUGH: 0
SORE THROAT: 0
RHINORRHEA: 1
ABDOMINAL PAIN: 0

## 2025-02-11 NOTE — PROGRESS NOTES
Subjective   Patient ID: Camacho Thomas is a 59 y.o. male who presents for a Virtual Visit URI.    URI   This is a new problem. The current episode started yesterday. The problem has been gradually worsening. There has been no fever. Associated symptoms include congestion and rhinorrhea. Pertinent negatives include no abdominal pain, chest pain, coughing, diarrhea, dysuria, ear pain, headaches, joint pain, nausea, plugged ear sensation, sinus pain, sneezing, sore throat, vomiting or wheezing. Treatments tried: DayQuil. The treatment provided mild relief.       Review of Systems   HENT:  Positive for congestion and rhinorrhea. Negative for ear pain, sinus pain, sneezing and sore throat.    Respiratory:  Negative for cough and wheezing.    Cardiovascular:  Negative for chest pain.   Gastrointestinal:  Negative for abdominal pain, diarrhea, nausea and vomiting.   Genitourinary:  Negative for dysuria.   Musculoskeletal:  Negative for joint pain.   Neurological:  Negative for headaches.       Physical Exam not performed  E-visit questionnaire reviewed and discussed with patient.   All questions answered    Assessment/Plan   Problem List Items Addressed This Visit    None  Visit Diagnoses         Codes    Viral upper respiratory tract infection    -  Primary J06.9    Relevant Medications    pseudoephedrine (Sudafed) 60 mg tablet        Obtain Influenza, Covid testing  Follow up with PCP for new or worsening symptoms  Discussed with patient   Verbalized understanding, Teach back utilized

## 2025-02-11 NOTE — PATIENT INSTRUCTIONS
A prescription was sent to your pharmacy. Your pharmacist can answer any questions or concerns you may have      Obtain Influenza, Covid testing    Follow up with PCP for new or worsening symptoms      Common Cold  Usually starts 3-5 days after exposure and lasts about 10 days with day 3-5 the worst.   The cough may linger a little longer  Symptoms include Sneezing, Stuffy or Runny Nose, Sore Throat, Cough from post nasal drip, Watery Eyes, Sometimes fever    Treatment  Tylenol for aches and pains, fever  Children under 3 months should not take Tylenol, under 6 months should not take Ibuprofen or if dehydrated  Warm salt water gargles for throat discomfort  Lots of Fluids such as tea with honey, soup, broth, water, Electrolyte replacement drinks  Rest      Preventing Infection    Wash your hands. Wash your hands thoroughly and often with soap and water for at least 20 seconds. If soap and water aren't available, use an alcohol-based hand  that contains at least 60% alcohol. Teach your children the importance of hand-washing. Avoid touching your eyes, nose or mouth with unwashed hands.    Disinfect your stuff. Clean and disinfect high-touch surfaces, such as doorknobs, light switches, electronics, and kitchen and bathroom countertops daily. This is especially important when someone in your family has a cold. Wash children's toys periodically.    Cover your cough. Sneeze and cough into tissues. Throw away used tissues right away, then wash your hands thoroughly. If you don't have a tissue, sneeze or cough into the bend of your elbow and then wash your hands.    Don't share. Don't share drinking glasses or eating utensils with other family members. Use your own glass or disposable cups when you or someone else is sick. Label the cup or glass with the name of the person using it.    Stay away from people with colds. Avoid close contact with anyone who has a cold. Stay out of crowds, when possible. Avoid  touching your eyes, nose and mouth.  Review your  center's policies. Look for a  setting with good hygiene practices and clear policies about keeping sick children at home.    Take care of yourself. Eating well and getting exercise and enough sleep is good for your overall health.

## 2025-02-14 ENCOUNTER — TELEPHONE (OUTPATIENT)
Dept: RADIOLOGY | Facility: HOSPITAL | Age: 60
End: 2025-02-14
Payer: COMMERCIAL

## 2025-02-23 ENCOUNTER — TELEMEDICINE (OUTPATIENT)
Dept: PRIMARY CARE | Facility: CLINIC | Age: 60
End: 2025-02-23
Payer: COMMERCIAL

## 2025-02-23 DIAGNOSIS — J32.9 RHINOSINUSITIS: Primary | ICD-10-CM

## 2025-02-23 PROCEDURE — 99213 OFFICE O/P EST LOW 20 MIN: CPT | Performed by: NURSE PRACTITIONER

## 2025-02-23 RX ORDER — FLUTICASONE PROPIONATE 50 MCG
1 SPRAY, SUSPENSION (ML) NASAL DAILY
Qty: 15.8 ML | Refills: 0 | Status: SHIPPED | OUTPATIENT
Start: 2025-02-23

## 2025-02-23 RX ORDER — AMOXICILLIN AND CLAVULANATE POTASSIUM 875; 125 MG/1; MG/1
1 TABLET, FILM COATED ORAL 2 TIMES DAILY
Qty: 14 TABLET | Refills: 0 | Status: SHIPPED | OUTPATIENT
Start: 2025-02-23 | End: 2025-03-02

## 2025-02-23 ASSESSMENT — ENCOUNTER SYMPTOMS
VOMITING: 0
SHORTNESS OF BREATH: 0
COUGH: 0
DIAPHORESIS: 0
FATIGUE: 1
NAUSEA: 0
HEADACHES: 0
LIGHT-HEADEDNESS: 0
MYALGIAS: 0
FEVER: 0
CHILLS: 0
RHINORRHEA: 1
TROUBLE SWALLOWING: 0
ACTIVITY CHANGE: 0
DIZZINESS: 0
APPETITE CHANGE: 0
SORE THROAT: 0
SINUS PRESSURE: 1

## 2025-02-23 ASSESSMENT — LIFESTYLE VARIABLES: HISTORY_OF_SMOKING: I AM A CURRENT SMOKER

## 2025-02-23 NOTE — PATIENT INSTRUCTIONS
Pleasure meeting with you today!    Let me know if you need anything.     Please send me a MyChart message if you have any questions or concerns.  FOR NON URGENT questions only.  Allow up to 72 hours for response.     If you have prescription issues or other questions you can email   Jaquan Francois,  Digital Health Coordinator, at   [email or web address]

## 2025-02-23 NOTE — PROGRESS NOTES
Subjective   Patient ID: Camacho Thomas is a 59 y.o. male who presents for URI (Sx onset: 2 weeks ago).    Sx improved last week then returned, started worsening over night   Sx today: nasal nose, sinus congestion, facial pressure, discolored drainage, nasal burning  Denies fever, facial pain, cough, HA  02/11/2025 was treated for Viral URI  Assessment/Plan  Problem List Items Addressed This Visit   None  Visit Diagnoses          Codes    Viral upper respiratory tract infection    -  Primary J06.9    Relevant Medications    pseudoephedrine (Sudafed) 60 mg tablet        Obtain Influenza, Covid testing  Follow up with PCP for new or worsening symptoms  Discussed with patient   Verbalized understanding, Teach back utilized      URI   Associated symptoms include congestion and rhinorrhea. Pertinent negatives include no chest pain, coughing, headaches, nausea, sore throat or vomiting.        Review of Systems   Constitutional:  Positive for fatigue. Negative for activity change, appetite change, chills, diaphoresis and fever.   HENT:  Positive for congestion, postnasal drip, rhinorrhea and sinus pressure. Negative for sore throat and trouble swallowing.    Respiratory:  Negative for cough and shortness of breath.    Cardiovascular:  Negative for chest pain.   Gastrointestinal:  Negative for nausea and vomiting.   Musculoskeletal:  Negative for myalgias.   Neurological:  Negative for dizziness, light-headedness and headaches.       Objective   There were no vitals taken for this visit.    Physical Exam  Constitutional:       General: He is not in acute distress.     Appearance: Normal appearance. He is ill-appearing.      Comments: On Demand Virtual Visit Patient Consent     An interactive audio and video telecommunication system which permits real time communications between the patient (at the originating site) and provider (at the distant site) was utilized to provide this telehealth service.   Verbal consent was  requested and obtained from Camacho Thomas (or parent if under 18) on this date, for a telehealth visit.   I have verbally confirmed with Camacho Thomas (or parent if under 18) that they are physically located in the [facility] during this virtual visit.    I performed this visit using realtime telehealth tools, including an audio/video OR telephone connection between the patient listed who was located in the [place] and myself, Roberto Golden CNP (licensed in the [facility]).  At the start of the visit, I introduced myself as Roberto Golden, Nurse practitioner and verified the patients name, , and current physical location.    If they were currently outside of the state of [place], the visit was ended and the patient was referred to alternative means for evaluation and treatment.   The patient was made aware of the limitations of the telehealth visit.  They will not be physically examined and all issues may not be appropriate for a telehealth visit.  If necessary, an in person referral will be made.       DISCLAIMER:   In preparing for this visit and writing this note, I reviewed previous electronic medical records (labs, imaging and medical charts) available.  Significant findings which helped in decision making are recorded in this encounter charting.     Pulmonary:      Effort: Pulmonary effort is normal.   Neurological:      Mental Status: He is alert and oriented to person, place, and time.         Assessment/Plan   Diagnoses and all orders for this visit:  Rhinosinusitis  -     amoxicillin-pot clavulanate (Augmentin) 875-125 mg tablet; Take 1 tablet by mouth 2 times a day for 7 days.  -     fluticasone (Flonase) 50 mcg/actuation nasal spray; Administer 1 spray into each nostril once daily. Shake gently. Before first use, prime pump. After use, clean tip and replace cap.     Complete entire coarse of antibiotics  Follow up with PCP as needed  [facility]

## 2025-04-23 RX ORDER — HYDROXYZINE HYDROCHLORIDE 25 MG/1
1 TABLET, FILM COATED ORAL
COMMUNITY
Start: 2025-04-11

## 2025-04-23 RX ORDER — CHLORHEXIDINE GLUCONATE ORAL RINSE 1.2 MG/ML
SOLUTION DENTAL
COMMUNITY
Start: 2024-11-11

## 2025-04-23 RX ORDER — ATOMOXETINE 40 MG/1
40 CAPSULE ORAL DAILY
COMMUNITY
Start: 2025-04-11

## 2025-04-24 DIAGNOSIS — G62.9 POLYNEUROPATHY, UNSPECIFIED: ICD-10-CM

## 2025-04-25 ENCOUNTER — TELEPHONE (OUTPATIENT)
Dept: PRIMARY CARE | Facility: CLINIC | Age: 60
End: 2025-04-25

## 2025-04-25 RX ORDER — PREGABALIN 300 MG/1
300 CAPSULE ORAL 2 TIMES DAILY
Qty: 180 CAPSULE | Refills: 0 | Status: SHIPPED | OUTPATIENT
Start: 2025-04-25

## 2025-04-25 NOTE — TELEPHONE ENCOUNTER
MED REFILL    [facility]    Pregabalin 300 mg    LOV 10/28/24    NOV 04/28/25

## 2025-04-28 ENCOUNTER — APPOINTMENT (OUTPATIENT)
Dept: PRIMARY CARE | Facility: CLINIC | Age: 60
End: 2025-04-28
Payer: COMMERCIAL

## 2025-05-30 LAB
ALBUMIN SERPL-MCNC: 4.3 G/DL (ref 3.6–5.1)
ALP SERPL-CCNC: 57 U/L (ref 35–144)
ALT SERPL-CCNC: 19 U/L (ref 9–46)
ANION GAP SERPL CALCULATED.4IONS-SCNC: 7 MMOL/L (CALC) (ref 7–17)
AST SERPL-CCNC: 29 U/L (ref 10–35)
BASOPHILS # BLD AUTO: 60 CELLS/UL (ref 0–200)
BASOPHILS NFR BLD AUTO: 0.7 %
BILIRUB SERPL-MCNC: 0.8 MG/DL (ref 0.2–1.2)
BUN SERPL-MCNC: 12 MG/DL (ref 7–25)
CALCIUM SERPL-MCNC: 8.9 MG/DL (ref 8.6–10.3)
CHLORIDE SERPL-SCNC: 105 MMOL/L (ref 98–110)
CHOLEST SERPL-MCNC: 112 MG/DL
CHOLEST/HDLC SERPL: 3 (CALC)
CO2 SERPL-SCNC: 28 MMOL/L (ref 20–32)
CREAT SERPL-MCNC: 0.82 MG/DL (ref 0.7–1.35)
EGFRCR SERPLBLD CKD-EPI 2021: 101 ML/MIN/1.73M2
EOSINOPHIL # BLD AUTO: 162 CELLS/UL (ref 15–500)
EOSINOPHIL NFR BLD AUTO: 1.9 %
ERYTHROCYTE [DISTWIDTH] IN BLOOD BY AUTOMATED COUNT: 12.2 % (ref 11–15)
GLUCOSE SERPL-MCNC: 89 MG/DL (ref 65–99)
HCT VFR BLD AUTO: 39.2 % (ref 38.5–50)
HDLC SERPL-MCNC: 37 MG/DL
HGB BLD-MCNC: 13.4 G/DL (ref 13.2–17.1)
LDLC SERPL CALC-MCNC: 60 MG/DL (CALC)
LYMPHOCYTES # BLD AUTO: 1615 CELLS/UL (ref 850–3900)
LYMPHOCYTES NFR BLD AUTO: 19 %
MCH RBC QN AUTO: 31.5 PG (ref 27–33)
MCHC RBC AUTO-ENTMCNC: 34.2 G/DL (ref 32–36)
MCV RBC AUTO: 92 FL (ref 80–100)
MONOCYTES # BLD AUTO: 587 CELLS/UL (ref 200–950)
MONOCYTES NFR BLD AUTO: 6.9 %
NEUTROPHILS # BLD AUTO: 6078 CELLS/UL (ref 1500–7800)
NEUTROPHILS NFR BLD AUTO: 71.5 %
NONHDLC SERPL-MCNC: 75 MG/DL (CALC)
PLATELET # BLD AUTO: 197 THOUSAND/UL (ref 140–400)
PMV BLD REES-ECKER: 12.1 FL (ref 7.5–12.5)
POTASSIUM SERPL-SCNC: 4.3 MMOL/L (ref 3.5–5.3)
PROT SERPL-MCNC: 7 G/DL (ref 6.1–8.1)
PSA SERPL-MCNC: 0.29 NG/ML
RBC # BLD AUTO: 4.26 MILLION/UL (ref 4.2–5.8)
SODIUM SERPL-SCNC: 140 MMOL/L (ref 135–146)
TRIGL SERPL-MCNC: 72 MG/DL
WBC # BLD AUTO: 8.5 THOUSAND/UL (ref 3.8–10.8)

## 2025-06-02 ENCOUNTER — APPOINTMENT (OUTPATIENT)
Dept: PRIMARY CARE | Facility: CLINIC | Age: 60
End: 2025-06-02
Payer: COMMERCIAL

## 2025-06-02 VITALS
TEMPERATURE: 97.5 F | HEIGHT: 73 IN | OXYGEN SATURATION: 96 % | BODY MASS INDEX: 23.19 KG/M2 | SYSTOLIC BLOOD PRESSURE: 137 MMHG | WEIGHT: 175 LBS | HEART RATE: 56 BPM | DIASTOLIC BLOOD PRESSURE: 75 MMHG

## 2025-06-02 DIAGNOSIS — F17.210 CIGARETTE NICOTINE DEPENDENCE WITHOUT COMPLICATION: ICD-10-CM

## 2025-06-02 DIAGNOSIS — F41.1 GENERALIZED ANXIETY DISORDER: ICD-10-CM

## 2025-06-02 DIAGNOSIS — R35.0 BENIGN PROSTATIC HYPERPLASIA WITH URINARY FREQUENCY: ICD-10-CM

## 2025-06-02 DIAGNOSIS — J44.9 MILD CHRONIC OBSTRUCTIVE PULMONARY DISEASE (MULTI): ICD-10-CM

## 2025-06-02 DIAGNOSIS — G62.9 POLYNEUROPATHY, UNSPECIFIED: ICD-10-CM

## 2025-06-02 DIAGNOSIS — G25.81 RESTLESS LEGS SYNDROME: Primary | ICD-10-CM

## 2025-06-02 DIAGNOSIS — N40.1 BENIGN PROSTATIC HYPERPLASIA WITH URINARY FREQUENCY: ICD-10-CM

## 2025-06-02 DIAGNOSIS — F41.9 ANXIETY: ICD-10-CM

## 2025-06-02 DIAGNOSIS — R91.8 LUNG NODULES: ICD-10-CM

## 2025-06-02 PROBLEM — R73.01 IMPAIRED FASTING GLUCOSE: Status: RESOLVED | Noted: 2023-04-10 | Resolved: 2025-06-02

## 2025-06-02 PROBLEM — R42 LIGHTHEADEDNESS: Status: RESOLVED | Noted: 2023-04-10 | Resolved: 2025-06-02

## 2025-06-02 PROBLEM — E87.6 HYPOKALEMIA: Status: RESOLVED | Noted: 2023-04-10 | Resolved: 2025-06-02

## 2025-06-02 PROBLEM — D72.829 LEUKOCYTOSIS: Status: RESOLVED | Noted: 2023-04-10 | Resolved: 2025-06-02

## 2025-06-02 PROCEDURE — 99214 OFFICE O/P EST MOD 30 MIN: CPT | Performed by: FAMILY MEDICINE

## 2025-06-02 PROCEDURE — 3008F BODY MASS INDEX DOCD: CPT | Performed by: FAMILY MEDICINE

## 2025-06-02 PROCEDURE — 4004F PT TOBACCO SCREEN RCVD TLK: CPT | Performed by: FAMILY MEDICINE

## 2025-06-02 RX ORDER — ROPINIROLE 1 MG/1
1 TABLET, FILM COATED ORAL 3 TIMES DAILY
Qty: 270 TABLET | Refills: 3 | Status: SHIPPED | OUTPATIENT
Start: 2025-06-02

## 2025-06-02 RX ORDER — VARENICLINE TARTRATE 1 MG/1
1 TABLET, FILM COATED ORAL 2 TIMES DAILY
Qty: 180 TABLET | Refills: 0 | Status: SHIPPED | OUTPATIENT
Start: 2025-06-02

## 2025-06-02 RX ORDER — ESCITALOPRAM OXALATE 20 MG/1
20 TABLET ORAL DAILY
Qty: 90 TABLET | Refills: 3 | Status: SHIPPED | OUTPATIENT
Start: 2025-06-02

## 2025-06-02 RX ORDER — PREGABALIN 300 MG/1
300 CAPSULE ORAL 2 TIMES DAILY
Qty: 180 CAPSULE | Refills: 0 | Status: CANCELLED | OUTPATIENT
Start: 2025-06-02

## 2025-06-02 ASSESSMENT — ENCOUNTER SYMPTOMS: SHORTNESS OF BREATH: 1

## 2025-06-02 NOTE — PROGRESS NOTES
"Subjective   Patient ID: Camacho Thomas is a 59 y.o. male who presents for Follow-up. States he has been taking two of the 1 mg Ropinirole and would like a dose increase. Had his labs done recently.         Occ cheating on the smoking   Chantix helps and tolerated      Anxiety: On Lexapro, controlling current symptoms  Sxs are ok       Peripheral neuropathy: On Lyrica  Does need meds ., feet are sensitive      GERD  needs meds daily , has hiatal hernia      Restless legs ,  taking meds , doing ok with meds   Recent blood work was ok       Copd :  breathing is ok   No SHORTNESS OF BREATH   ,no CP           Review of Systems   Respiratory:  Positive for shortness of breath.    Cardiovascular:  Negative for chest pain.       Objective   /75   Pulse 56   Temp 36.4 °C (97.5 °F)   Ht 1.854 m (6' 1\")   Wt 79.4 kg (175 lb)   SpO2 96%   BMI 23.09 kg/m²     Physical Exam  Constitutional:       Appearance: Normal appearance. He is well-developed.   Cardiovascular:      Rate and Rhythm: Normal rate and regular rhythm.      Heart sounds: Normal heart sounds. No murmur heard.  Pulmonary:      Effort: Pulmonary effort is normal.      Breath sounds: Normal breath sounds.   Abdominal:      General: Abdomen is flat.      Palpations: Abdomen is soft.   Neurological:      General: No focal deficit present.      Mental Status: He is alert and oriented to person, place, and time.   Psychiatric:         Mood and Affect: Mood normal.         Assessment/Plan   Problem List Items Addressed This Visit           ICD-10-CM    RESOLVED: Anxiety F41.9    Relevant Medications    escitalopram (Lexapro) 20 mg tablet    BPH (benign prostatic hyperplasia) N40.0    Generalized anxiety disorder F41.1    Lung nodules R91.8    Restless legs syndrome - Primary G25.81     Other Visit Diagnoses         Codes      Polyneuropathy, unspecified     G62.9    Relevant Medications    rOPINIRole (Requip) 1 mg tablet      Cigarette nicotine dependence " without complication     F17.210    Relevant Medications    varenicline tartrate (Chantix) 1 mg tablet

## 2025-06-02 NOTE — PATIENT INSTRUCTIONS
Restless legs ,  increase to 3 mg daily       Continue Lyrica     Anxiety :  For your anxiety is important that you do activities that contribute to relaxation.  Regular exercise and adequate sleep are very important.  Counseling can be beneficial as well.  If you are  on medications for anxiety is important that you take them as directed and let your physician know if you continue to have symptoms or if your symptoms worsen.  It is also important that you be seen in the office on a regular basis at least every 6 months.      CT screening:  I discussed the patient's smoking history and risk for lung cancer.  I discussed the CT scan for screening.  Including discussion of Harms and benefits of screening, follow-up diagnostic testing, and benefit of early detection.  The CAT scan of the lungs oftentimes will  cancer earlier and may result any better outcome than waiting for a lung cancer becomes symptomatic.  There are sometimes however people that do have false positive tests as well.  I gave the patient order for the CAT scan for screening purposes.  Determined the patient fits in the criteria based on the age range and smoking history to receive the CT     Order is in computer :  please schedule CT     Labs reviewed with the patient

## 2025-06-26 ENCOUNTER — APPOINTMENT (OUTPATIENT)
Dept: RADIOLOGY | Facility: HOSPITAL | Age: 60
DRG: 281 | End: 2025-06-26
Payer: COMMERCIAL

## 2025-06-26 ENCOUNTER — APPOINTMENT (OUTPATIENT)
Dept: CARDIOLOGY | Facility: HOSPITAL | Age: 60
DRG: 281 | End: 2025-06-26
Payer: COMMERCIAL

## 2025-06-26 ENCOUNTER — HOSPITAL ENCOUNTER (INPATIENT)
Facility: HOSPITAL | Age: 60
LOS: 1 days | Discharge: HOME | DRG: 281 | End: 2025-06-27
Attending: STUDENT IN AN ORGANIZED HEALTH CARE EDUCATION/TRAINING PROGRAM | Admitting: INTERNAL MEDICINE
Payer: COMMERCIAL

## 2025-06-26 DIAGNOSIS — I21.4 NSTEMI (NON-ST ELEVATED MYOCARDIAL INFARCTION) (MULTI): Primary | ICD-10-CM

## 2025-06-26 DIAGNOSIS — I30.9 ACUTE PERICARDITIS, UNSPECIFIED TYPE (HHS-HCC): ICD-10-CM

## 2025-06-26 LAB
ALBUMIN SERPL BCP-MCNC: 4.1 G/DL (ref 3.4–5)
ALP SERPL-CCNC: 63 U/L (ref 33–136)
ALT SERPL W P-5'-P-CCNC: 16 U/L (ref 10–52)
ANION GAP SERPL CALC-SCNC: 11 MMOL/L (ref 10–20)
APTT PPP: 27 SECONDS (ref 26–36)
AST SERPL W P-5'-P-CCNC: 19 U/L (ref 9–39)
BASOPHILS # BLD AUTO: 0.03 X10*3/UL (ref 0–0.1)
BASOPHILS NFR BLD AUTO: 0.3 %
BILIRUB SERPL-MCNC: 0.7 MG/DL (ref 0–1.2)
BUN SERPL-MCNC: 12 MG/DL (ref 6–23)
CALCIUM SERPL-MCNC: 8.8 MG/DL (ref 8.6–10.3)
CARDIAC TROPONIN I PNL SERPL HS: 26 NG/L (ref 0–20)
CARDIAC TROPONIN I PNL SERPL HS: 32 NG/L (ref 0–20)
CHLORIDE SERPL-SCNC: 102 MMOL/L (ref 98–107)
CO2 SERPL-SCNC: 28 MMOL/L (ref 21–32)
CREAT SERPL-MCNC: 0.93 MG/DL (ref 0.5–1.3)
EGFRCR SERPLBLD CKD-EPI 2021: >90 ML/MIN/1.73M*2
EOSINOPHIL # BLD AUTO: 0.13 X10*3/UL (ref 0–0.7)
EOSINOPHIL NFR BLD AUTO: 1.3 %
ERYTHROCYTE [DISTWIDTH] IN BLOOD BY AUTOMATED COUNT: 12.9 % (ref 11.5–14.5)
GLUCOSE SERPL-MCNC: 112 MG/DL (ref 74–99)
HCT VFR BLD AUTO: 34.1 % (ref 41–52)
HGB BLD-MCNC: 12.2 G/DL (ref 13.5–17.5)
IMM GRANULOCYTES # BLD AUTO: 0.04 X10*3/UL (ref 0–0.7)
IMM GRANULOCYTES NFR BLD AUTO: 0.4 % (ref 0–0.9)
INR PPP: 1.1 (ref 0.9–1.1)
LYMPHOCYTES # BLD AUTO: 2.43 X10*3/UL (ref 1.2–4.8)
LYMPHOCYTES NFR BLD AUTO: 24.9 %
MAGNESIUM SERPL-MCNC: 1.82 MG/DL (ref 1.6–2.4)
MCH RBC QN AUTO: 31.9 PG (ref 26–34)
MCHC RBC AUTO-ENTMCNC: 35.8 G/DL (ref 32–36)
MCV RBC AUTO: 89 FL (ref 80–100)
MONOCYTES # BLD AUTO: 0.73 X10*3/UL (ref 0.1–1)
MONOCYTES NFR BLD AUTO: 7.5 %
NEUTROPHILS # BLD AUTO: 6.38 X10*3/UL (ref 1.2–7.7)
NEUTROPHILS NFR BLD AUTO: 65.6 %
NRBC BLD-RTO: 0 /100 WBCS (ref 0–0)
PLATELET # BLD AUTO: 193 X10*3/UL (ref 150–450)
POTASSIUM SERPL-SCNC: 3.7 MMOL/L (ref 3.5–5.3)
PROT SERPL-MCNC: 7 G/DL (ref 6.4–8.2)
PROTHROMBIN TIME: 11.8 SECONDS (ref 9.8–12.4)
RBC # BLD AUTO: 3.82 X10*6/UL (ref 4.5–5.9)
SODIUM SERPL-SCNC: 137 MMOL/L (ref 136–145)
WBC # BLD AUTO: 9.7 X10*3/UL (ref 4.4–11.3)

## 2025-06-26 PROCEDURE — 36415 COLL VENOUS BLD VENIPUNCTURE: CPT

## 2025-06-26 PROCEDURE — 99223 1ST HOSP IP/OBS HIGH 75: CPT | Performed by: INTERNAL MEDICINE

## 2025-06-26 PROCEDURE — 99291 CRITICAL CARE FIRST HOUR: CPT | Performed by: STUDENT IN AN ORGANIZED HEALTH CARE EDUCATION/TRAINING PROGRAM

## 2025-06-26 PROCEDURE — 36415 COLL VENOUS BLD VENIPUNCTURE: CPT | Performed by: STUDENT IN AN ORGANIZED HEALTH CARE EDUCATION/TRAINING PROGRAM

## 2025-06-26 PROCEDURE — 71275 CT ANGIOGRAPHY CHEST: CPT

## 2025-06-26 PROCEDURE — 71275 CT ANGIOGRAPHY CHEST: CPT | Performed by: RADIOLOGY

## 2025-06-26 PROCEDURE — 85025 COMPLETE CBC W/AUTO DIFF WBC: CPT

## 2025-06-26 PROCEDURE — 1100000001 HC PRIVATE ROOM DAILY

## 2025-06-26 PROCEDURE — 80053 COMPREHEN METABOLIC PANEL: CPT

## 2025-06-26 PROCEDURE — 2500000004 HC RX 250 GENERAL PHARMACY W/ HCPCS (ALT 636 FOR OP/ED): Performed by: STUDENT IN AN ORGANIZED HEALTH CARE EDUCATION/TRAINING PROGRAM

## 2025-06-26 PROCEDURE — 96374 THER/PROPH/DIAG INJ IV PUSH: CPT

## 2025-06-26 PROCEDURE — 2550000001 HC RX 255 CONTRASTS: Performed by: STUDENT IN AN ORGANIZED HEALTH CARE EDUCATION/TRAINING PROGRAM

## 2025-06-26 PROCEDURE — 74174 CTA ABD&PLVS W/CONTRAST: CPT | Performed by: RADIOLOGY

## 2025-06-26 PROCEDURE — 83735 ASSAY OF MAGNESIUM: CPT

## 2025-06-26 PROCEDURE — 93005 ELECTROCARDIOGRAM TRACING: CPT

## 2025-06-26 PROCEDURE — 85610 PROTHROMBIN TIME: CPT | Performed by: STUDENT IN AN ORGANIZED HEALTH CARE EDUCATION/TRAINING PROGRAM

## 2025-06-26 PROCEDURE — 84484 ASSAY OF TROPONIN QUANT: CPT

## 2025-06-26 RX ORDER — ATOMOXETINE 40 MG/1
40 CAPSULE ORAL DAILY
COMMUNITY

## 2025-06-26 RX ORDER — ROPINIROLE 1 MG/1
1 TABLET, FILM COATED ORAL 3 TIMES DAILY
Status: CANCELLED | OUTPATIENT
Start: 2025-06-26

## 2025-06-26 RX ORDER — HYDROXYZINE HYDROCHLORIDE 25 MG/1
25 TABLET, FILM COATED ORAL EVERY 8 HOURS PRN
Status: CANCELLED | OUTPATIENT
Start: 2025-06-26

## 2025-06-26 RX ORDER — ESCITALOPRAM OXALATE 20 MG/1
20 TABLET ORAL DAILY
Status: CANCELLED | OUTPATIENT
Start: 2025-06-27

## 2025-06-26 RX ORDER — VARENICLINE TARTRATE 1 MG/1
1 TABLET, FILM COATED ORAL 2 TIMES DAILY
Status: CANCELLED | OUTPATIENT
Start: 2025-06-26

## 2025-06-26 RX ORDER — HEPARIN SODIUM 10000 [USP'U]/100ML
0-4000 INJECTION, SOLUTION INTRAVENOUS CONTINUOUS
Status: DISCONTINUED | OUTPATIENT
Start: 2025-06-26 | End: 2025-06-27

## 2025-06-26 RX ORDER — PREGABALIN 75 MG/1
300 CAPSULE ORAL 2 TIMES DAILY
Status: CANCELLED | OUTPATIENT
Start: 2025-06-26

## 2025-06-26 RX ORDER — PANTOPRAZOLE SODIUM 40 MG/1
40 TABLET, DELAYED RELEASE ORAL DAILY
Status: CANCELLED | OUTPATIENT
Start: 2025-06-27

## 2025-06-26 RX ADMIN — HEPARIN SODIUM 1000 UNITS/HR: 10000 INJECTION, SOLUTION INTRAVENOUS at 22:24

## 2025-06-26 RX ADMIN — IOHEXOL 90 ML: 350 INJECTION, SOLUTION INTRAVENOUS at 21:36

## 2025-06-26 SDOH — ECONOMIC STABILITY: HOUSING INSECURITY: AT ANY TIME IN THE PAST 12 MONTHS, WERE YOU HOMELESS OR LIVING IN A SHELTER (INCLUDING NOW)?: NO

## 2025-06-26 SDOH — ECONOMIC STABILITY: INCOME INSECURITY: IN THE PAST 12 MONTHS HAS THE ELECTRIC, GAS, OIL, OR WATER COMPANY THREATENED TO SHUT OFF SERVICES IN YOUR HOME?: NO

## 2025-06-26 SDOH — SOCIAL STABILITY: SOCIAL INSECURITY: DOES ANYONE TRY TO KEEP YOU FROM HAVING/CONTACTING OTHER FRIENDS OR DOING THINGS OUTSIDE YOUR HOME?: NO

## 2025-06-26 SDOH — ECONOMIC STABILITY: HOUSING INSECURITY: IN THE LAST 12 MONTHS, WAS THERE A TIME WHEN YOU WERE NOT ABLE TO PAY THE MORTGAGE OR RENT ON TIME?: NO

## 2025-06-26 SDOH — SOCIAL STABILITY: SOCIAL INSECURITY
WITHIN THE LAST YEAR, HAVE YOU BEEN KICKED, HIT, SLAPPED, OR OTHERWISE PHYSICALLY HURT BY YOUR PARTNER OR EX-PARTNER?: NO

## 2025-06-26 SDOH — SOCIAL STABILITY: SOCIAL INSECURITY
WITHIN THE LAST YEAR, HAVE YOU BEEN RAPED OR FORCED TO HAVE ANY KIND OF SEXUAL ACTIVITY BY YOUR PARTNER OR EX-PARTNER?: NO

## 2025-06-26 SDOH — ECONOMIC STABILITY: FOOD INSECURITY: HOW HARD IS IT FOR YOU TO PAY FOR THE VERY BASICS LIKE FOOD, HOUSING, MEDICAL CARE, AND HEATING?: NOT VERY HARD

## 2025-06-26 SDOH — HEALTH STABILITY: MENTAL HEALTH: HOW OFTEN DO YOU HAVE A DRINK CONTAINING ALCOHOL?: NEVER

## 2025-06-26 SDOH — ECONOMIC STABILITY: FOOD INSECURITY: WITHIN THE PAST 12 MONTHS, YOU WORRIED THAT YOUR FOOD WOULD RUN OUT BEFORE YOU GOT THE MONEY TO BUY MORE.: NEVER TRUE

## 2025-06-26 SDOH — SOCIAL STABILITY: SOCIAL INSECURITY: DO YOU FEEL UNSAFE GOING BACK TO THE PLACE WHERE YOU ARE LIVING?: NO

## 2025-06-26 SDOH — HEALTH STABILITY: MENTAL HEALTH: HOW OFTEN DO YOU HAVE SIX OR MORE DRINKS ON ONE OCCASION?: NEVER

## 2025-06-26 SDOH — SOCIAL STABILITY: SOCIAL INSECURITY: WITHIN THE LAST YEAR, HAVE YOU BEEN AFRAID OF YOUR PARTNER OR EX-PARTNER?: NO

## 2025-06-26 SDOH — ECONOMIC STABILITY: HOUSING INSECURITY: IN THE PAST 12 MONTHS, HOW MANY TIMES HAVE YOU MOVED WHERE YOU WERE LIVING?: 0

## 2025-06-26 SDOH — SOCIAL STABILITY: SOCIAL INSECURITY: ARE YOU OR HAVE YOU BEEN THREATENED OR ABUSED PHYSICALLY, EMOTIONALLY, OR SEXUALLY BY ANYONE?: NO

## 2025-06-26 SDOH — SOCIAL STABILITY: SOCIAL INSECURITY: WITHIN THE LAST YEAR, HAVE YOU BEEN HUMILIATED OR EMOTIONALLY ABUSED IN OTHER WAYS BY YOUR PARTNER OR EX-PARTNER?: NO

## 2025-06-26 SDOH — SOCIAL STABILITY: SOCIAL INSECURITY: HAVE YOU HAD THOUGHTS OF HARMING ANYONE ELSE?: NO

## 2025-06-26 SDOH — HEALTH STABILITY: MENTAL HEALTH: HOW MANY DRINKS CONTAINING ALCOHOL DO YOU HAVE ON A TYPICAL DAY WHEN YOU ARE DRINKING?: PATIENT DOES NOT DRINK

## 2025-06-26 SDOH — SOCIAL STABILITY: SOCIAL INSECURITY: ABUSE: ADULT

## 2025-06-26 SDOH — ECONOMIC STABILITY: FOOD INSECURITY: WITHIN THE PAST 12 MONTHS, THE FOOD YOU BOUGHT JUST DIDN'T LAST AND YOU DIDN'T HAVE MONEY TO GET MORE.: NEVER TRUE

## 2025-06-26 SDOH — SOCIAL STABILITY: SOCIAL INSECURITY: DO YOU FEEL ANYONE HAS EXPLOITED OR TAKEN ADVANTAGE OF YOU FINANCIALLY OR OF YOUR PERSONAL PROPERTY?: NO

## 2025-06-26 SDOH — SOCIAL STABILITY: SOCIAL INSECURITY: ARE THERE ANY APPARENT SIGNS OF INJURIES/BEHAVIORS THAT COULD BE RELATED TO ABUSE/NEGLECT?: NO

## 2025-06-26 SDOH — SOCIAL STABILITY: SOCIAL INSECURITY: HAS ANYONE EVER THREATENED TO HURT YOUR FAMILY OR YOUR PETS?: NO

## 2025-06-26 SDOH — ECONOMIC STABILITY: TRANSPORTATION INSECURITY: IN THE PAST 12 MONTHS, HAS LACK OF TRANSPORTATION KEPT YOU FROM MEDICAL APPOINTMENTS OR FROM GETTING MEDICATIONS?: NO

## 2025-06-26 SDOH — SOCIAL STABILITY: SOCIAL INSECURITY: HAVE YOU HAD ANY THOUGHTS OF HARMING ANYONE ELSE?: NO

## 2025-06-26 SDOH — SOCIAL STABILITY: SOCIAL INSECURITY: WERE YOU ABLE TO COMPLETE ALL THE BEHAVIORAL HEALTH SCREENINGS?: YES

## 2025-06-26 ASSESSMENT — ACTIVITIES OF DAILY LIVING (ADL)
HEARING - RIGHT EAR: FUNCTIONAL
TOILETING: INDEPENDENT
ADEQUATE_TO_COMPLETE_ADL: YES
BATHING: INDEPENDENT
JUDGMENT_ADEQUATE_SAFELY_COMPLETE_DAILY_ACTIVITIES: YES
PATIENT'S MEMORY ADEQUATE TO SAFELY COMPLETE DAILY ACTIVITIES?: YES
GROOMING: INDEPENDENT
LACK_OF_TRANSPORTATION: NO
DRESSING YOURSELF: INDEPENDENT
FEEDING YOURSELF: INDEPENDENT
ASSISTIVE_DEVICE: DENTURES LOWER
LACK_OF_TRANSPORTATION: NO
HEARING - LEFT EAR: FUNCTIONAL
WALKS IN HOME: INDEPENDENT

## 2025-06-26 ASSESSMENT — PAIN DESCRIPTION - ORIENTATION: ORIENTATION: MID

## 2025-06-26 ASSESSMENT — LIFESTYLE VARIABLES
SKIP TO QUESTIONS 9-10: 1
EVER HAD A DRINK FIRST THING IN THE MORNING TO STEADY YOUR NERVES TO GET RID OF A HANGOVER: NO
HAVE YOU EVER FELT YOU SHOULD CUT DOWN ON YOUR DRINKING: NO
EVER FELT BAD OR GUILTY ABOUT YOUR DRINKING: NO
HAVE PEOPLE ANNOYED YOU BY CRITICIZING YOUR DRINKING: NO
TOTAL SCORE: 0
AUDIT-C TOTAL SCORE: 0

## 2025-06-26 ASSESSMENT — COGNITIVE AND FUNCTIONAL STATUS - GENERAL
MOBILITY SCORE: 24
DAILY ACTIVITIY SCORE: 24
PATIENT BASELINE BEDBOUND: NO

## 2025-06-26 ASSESSMENT — PATIENT HEALTH QUESTIONNAIRE - PHQ9
1. LITTLE INTEREST OR PLEASURE IN DOING THINGS: NOT AT ALL
2. FEELING DOWN, DEPRESSED OR HOPELESS: NOT AT ALL
SUM OF ALL RESPONSES TO PHQ9 QUESTIONS 1 & 2: 0

## 2025-06-26 ASSESSMENT — PAIN SCALES - GENERAL
PAINLEVEL_OUTOF10: 5 - MODERATE PAIN
PAINLEVEL_OUTOF10: 5 - MODERATE PAIN

## 2025-06-26 ASSESSMENT — PAIN DESCRIPTION - LOCATION: LOCATION: CHEST

## 2025-06-26 ASSESSMENT — PAIN DESCRIPTION - PAIN TYPE: TYPE: ACUTE PAIN

## 2025-06-26 ASSESSMENT — PAIN - FUNCTIONAL ASSESSMENT: PAIN_FUNCTIONAL_ASSESSMENT: 0-10

## 2025-06-26 ASSESSMENT — PAIN DESCRIPTION - DESCRIPTORS
DESCRIPTORS: PRESSURE;DISCOMFORT
DESCRIPTORS: PRESSURE;SHARP

## 2025-06-27 ENCOUNTER — APPOINTMENT (OUTPATIENT)
Dept: CARDIOLOGY | Facility: HOSPITAL | Age: 60
DRG: 281 | End: 2025-06-27
Payer: COMMERCIAL

## 2025-06-27 VITALS
TEMPERATURE: 98.4 F | BODY MASS INDEX: 22.44 KG/M2 | HEART RATE: 54 BPM | RESPIRATION RATE: 18 BRPM | SYSTOLIC BLOOD PRESSURE: 126 MMHG | OXYGEN SATURATION: 96 % | HEIGHT: 73 IN | DIASTOLIC BLOOD PRESSURE: 76 MMHG | WEIGHT: 169.31 LBS

## 2025-06-27 LAB
ANION GAP SERPL CALC-SCNC: 10 MMOL/L (ref 10–20)
AORTIC VALVE MEAN GRADIENT: 4 MMHG
AORTIC VALVE PEAK VELOCITY: 1.26 M/S
APTT PPP: 63 SECONDS (ref 26–36)
ATRIAL RATE: 58 BPM
ATRIAL RATE: 61 BPM
AV PEAK GRADIENT: 6 MMHG
AVA (PEAK VEL): 2.54 CM2
AVA (VTI): 2.56 CM2
BUN SERPL-MCNC: 11 MG/DL (ref 6–23)
CALCIUM SERPL-MCNC: 8.8 MG/DL (ref 8.6–10.3)
CARDIAC TROPONIN I PNL SERPL HS: 30 NG/L (ref 0–20)
CHLORIDE SERPL-SCNC: 107 MMOL/L (ref 98–107)
CHOLEST SERPL-MCNC: 95 MG/DL (ref 0–199)
CHOLESTEROL/HDL RATIO: 2.4
CO2 SERPL-SCNC: 27 MMOL/L (ref 21–32)
CREAT SERPL-MCNC: 0.75 MG/DL (ref 0.5–1.3)
CRP SERPL-MCNC: 4.33 MG/DL
EGFRCR SERPLBLD CKD-EPI 2021: >90 ML/MIN/1.73M*2
EJECTION FRACTION APICAL 4 CHAMBER: 55
EJECTION FRACTION: 55 %
ERYTHROCYTE [DISTWIDTH] IN BLOOD BY AUTOMATED COUNT: 12.8 % (ref 11.5–14.5)
ERYTHROCYTE [SEDIMENTATION RATE] IN BLOOD BY WESTERGREN METHOD: 8 MM/H (ref 0–20)
GLUCOSE SERPL-MCNC: 93 MG/DL (ref 74–99)
HCT VFR BLD AUTO: 35.3 % (ref 41–52)
HDLC SERPL-MCNC: 38.8 MG/DL
HGB BLD-MCNC: 12.6 G/DL (ref 13.5–17.5)
INR PPP: 1.1 (ref 0.9–1.1)
LDLC SERPL CALC-MCNC: 47 MG/DL
LEFT ATRIUM VOLUME AREA LENGTH INDEX BSA: 20.6 ML/M2
LEFT VENTRICLE INTERNAL DIMENSION DIASTOLE: 5.3 CM (ref 3.5–6)
LEFT VENTRICULAR OUTFLOW TRACT DIAMETER: 2 CM
MCH RBC QN AUTO: 31.7 PG (ref 26–34)
MCHC RBC AUTO-ENTMCNC: 35.7 G/DL (ref 32–36)
MCV RBC AUTO: 89 FL (ref 80–100)
MITRAL VALVE E/A RATIO: 0.81
NON HDL CHOLESTEROL: 56 MG/DL (ref 0–149)
NRBC BLD-RTO: 0 /100 WBCS (ref 0–0)
P AXIS: 81 DEGREES
P AXIS: 84 DEGREES
P OFFSET: 161 MS
P OFFSET: 175 MS
P ONSET: 110 MS
P ONSET: 120 MS
PLATELET # BLD AUTO: 200 X10*3/UL (ref 150–450)
POTASSIUM SERPL-SCNC: 3.9 MMOL/L (ref 3.5–5.3)
PR INTERVAL: 200 MS
PR INTERVAL: 220 MS
PROTHROMBIN TIME: 12.6 SECONDS (ref 9.8–12.4)
Q ONSET: 220 MS
Q ONSET: 220 MS
QRS COUNT: 10 BEATS
QRS COUNT: 9 BEATS
QRS DURATION: 100 MS
QRS DURATION: 100 MS
QT INTERVAL: 434 MS
QT INTERVAL: 438 MS
QTC CALCULATION(BAZETT): 429 MS
QTC CALCULATION(BAZETT): 436 MS
QTC FREDERICIA: 432 MS
QTC FREDERICIA: 436 MS
R AXIS: 63 DEGREES
R AXIS: 67 DEGREES
RBC # BLD AUTO: 3.98 X10*6/UL (ref 4.5–5.9)
RIGHT VENTRICLE FREE WALL PEAK S': 11 CM/S
RIGHT VENTRICLE PEAK SYSTOLIC PRESSURE: 15 MMHG
SODIUM SERPL-SCNC: 140 MMOL/L (ref 136–145)
T AXIS: -5 DEGREES
T AXIS: 12 DEGREES
T OFFSET: 437 MS
T OFFSET: 439 MS
TRICUSPID ANNULAR PLANE SYSTOLIC EXCURSION: 2.3 CM
TRIGL SERPL-MCNC: 45 MG/DL (ref 0–149)
UFH PPP CHRO-ACNC: 0.3 IU/ML (ref ?–1.1)
UFH PPP CHRO-ACNC: 0.5 IU/ML (ref ?–1.1)
VENTRICULAR RATE: 58 BPM
VENTRICULAR RATE: 61 BPM
VLDL: 9 MG/DL (ref 0–40)
WBC # BLD AUTO: 9.2 X10*3/UL (ref 4.4–11.3)

## 2025-06-27 PROCEDURE — 36415 COLL VENOUS BLD VENIPUNCTURE: CPT | Performed by: INTERNAL MEDICINE

## 2025-06-27 PROCEDURE — 2500000002 HC RX 250 W HCPCS SELF ADMINISTERED DRUGS (ALT 637 FOR MEDICARE OP, ALT 636 FOR OP/ED): Performed by: INTERNAL MEDICINE

## 2025-06-27 PROCEDURE — 80048 BASIC METABOLIC PNL TOTAL CA: CPT | Performed by: INTERNAL MEDICINE

## 2025-06-27 PROCEDURE — 85730 THROMBOPLASTIN TIME PARTIAL: CPT | Performed by: INTERNAL MEDICINE

## 2025-06-27 PROCEDURE — 2500000005 HC RX 250 GENERAL PHARMACY W/O HCPCS: Performed by: INTERNAL MEDICINE

## 2025-06-27 PROCEDURE — 94760 N-INVAS EAR/PLS OXIMETRY 1: CPT

## 2025-06-27 PROCEDURE — 2500000001 HC RX 250 WO HCPCS SELF ADMINISTERED DRUGS (ALT 637 FOR MEDICARE OP): Performed by: INTERNAL MEDICINE

## 2025-06-27 PROCEDURE — 93306 TTE W/DOPPLER COMPLETE: CPT | Performed by: INTERNAL MEDICINE

## 2025-06-27 PROCEDURE — 86140 C-REACTIVE PROTEIN: CPT | Performed by: NURSE PRACTITIONER

## 2025-06-27 PROCEDURE — 85520 HEPARIN ASSAY: CPT | Performed by: INTERNAL MEDICINE

## 2025-06-27 PROCEDURE — 85652 RBC SED RATE AUTOMATED: CPT | Performed by: NURSE PRACTITIONER

## 2025-06-27 PROCEDURE — 80061 LIPID PANEL: CPT | Performed by: INTERNAL MEDICINE

## 2025-06-27 PROCEDURE — 99239 HOSP IP/OBS DSCHRG MGMT >30: CPT | Performed by: STUDENT IN AN ORGANIZED HEALTH CARE EDUCATION/TRAINING PROGRAM

## 2025-06-27 PROCEDURE — 85027 COMPLETE CBC AUTOMATED: CPT | Performed by: INTERNAL MEDICINE

## 2025-06-27 PROCEDURE — 93306 TTE W/DOPPLER COMPLETE: CPT

## 2025-06-27 PROCEDURE — 84484 ASSAY OF TROPONIN QUANT: CPT | Performed by: INTERNAL MEDICINE

## 2025-06-27 RX ORDER — ATORVASTATIN CALCIUM 80 MG/1
80 TABLET, FILM COATED ORAL NIGHTLY
Status: DISCONTINUED | OUTPATIENT
Start: 2025-06-27 | End: 2025-06-27 | Stop reason: HOSPADM

## 2025-06-27 RX ORDER — ESCITALOPRAM OXALATE 20 MG/1
20 TABLET ORAL DAILY
Status: DISCONTINUED | OUTPATIENT
Start: 2025-06-27 | End: 2025-06-27 | Stop reason: HOSPADM

## 2025-06-27 RX ORDER — DOCUSATE SODIUM 100 MG/1
100 CAPSULE, LIQUID FILLED ORAL 2 TIMES DAILY
Status: DISCONTINUED | OUTPATIENT
Start: 2025-06-27 | End: 2025-06-27 | Stop reason: HOSPADM

## 2025-06-27 RX ORDER — NAPROXEN SODIUM 220 MG/1
81 TABLET, FILM COATED ORAL DAILY
Qty: 30 TABLET | Refills: 0 | Status: SHIPPED | OUTPATIENT
Start: 2025-06-28

## 2025-06-27 RX ORDER — ACETAMINOPHEN 160 MG/5ML
650 SOLUTION ORAL EVERY 4 HOURS PRN
Status: DISCONTINUED | OUTPATIENT
Start: 2025-06-27 | End: 2025-06-27 | Stop reason: HOSPADM

## 2025-06-27 RX ORDER — TICAGRELOR 90 MG/1
90 TABLET, FILM COATED ORAL 2 TIMES DAILY
Status: DISCONTINUED | OUTPATIENT
Start: 2025-06-27 | End: 2025-06-27

## 2025-06-27 RX ORDER — HYDROXYZINE HYDROCHLORIDE 25 MG/1
25 TABLET, FILM COATED ORAL 3 TIMES DAILY PRN
Status: DISCONTINUED | OUTPATIENT
Start: 2025-06-27 | End: 2025-06-27 | Stop reason: HOSPADM

## 2025-06-27 RX ORDER — TICAGRELOR 90 MG/1
180 TABLET, FILM COATED ORAL ONCE
Status: COMPLETED | OUTPATIENT
Start: 2025-06-27 | End: 2025-06-27

## 2025-06-27 RX ORDER — ROPINIROLE 1 MG/1
1 TABLET, FILM COATED ORAL 3 TIMES DAILY
Status: DISCONTINUED | OUTPATIENT
Start: 2025-06-27 | End: 2025-06-27 | Stop reason: HOSPADM

## 2025-06-27 RX ORDER — COLCHICINE 0.6 MG/1
0.6 TABLET ORAL 2 TIMES DAILY
Qty: 60 TABLET | Refills: 0 | Status: SHIPPED | OUTPATIENT
Start: 2025-06-27 | End: 2025-07-27

## 2025-06-27 RX ORDER — ATOMOXETINE 40 MG/1
40 CAPSULE ORAL DAILY
Status: DISCONTINUED | OUTPATIENT
Start: 2025-06-27 | End: 2025-06-27 | Stop reason: HOSPADM

## 2025-06-27 RX ORDER — ACETAMINOPHEN 650 MG/1
650 SUPPOSITORY RECTAL EVERY 4 HOURS PRN
Status: DISCONTINUED | OUTPATIENT
Start: 2025-06-27 | End: 2025-06-27 | Stop reason: HOSPADM

## 2025-06-27 RX ORDER — COLCHICINE 0.6 MG/1
0.6 TABLET ORAL ONCE
Status: DISCONTINUED | OUTPATIENT
Start: 2025-06-27 | End: 2025-06-27 | Stop reason: HOSPADM

## 2025-06-27 RX ORDER — ACETAMINOPHEN 325 MG/1
650 TABLET ORAL EVERY 4 HOURS PRN
Status: DISCONTINUED | OUTPATIENT
Start: 2025-06-27 | End: 2025-06-27 | Stop reason: HOSPADM

## 2025-06-27 RX ORDER — VARENICLINE TARTRATE 1 MG/1
1 TABLET, FILM COATED ORAL 2 TIMES DAILY
Status: DISCONTINUED | OUTPATIENT
Start: 2025-06-27 | End: 2025-06-27 | Stop reason: HOSPADM

## 2025-06-27 RX ORDER — PREGABALIN 75 MG/1
300 CAPSULE ORAL 2 TIMES DAILY
Status: DISCONTINUED | OUTPATIENT
Start: 2025-06-27 | End: 2025-06-27 | Stop reason: HOSPADM

## 2025-06-27 RX ORDER — NAPROXEN SODIUM 220 MG/1
81 TABLET, FILM COATED ORAL DAILY
Status: DISCONTINUED | OUTPATIENT
Start: 2025-06-28 | End: 2025-06-27 | Stop reason: HOSPADM

## 2025-06-27 RX ORDER — METOPROLOL TARTRATE 25 MG/1
25 TABLET, FILM COATED ORAL EVERY 12 HOURS SCHEDULED
Status: DISCONTINUED | OUTPATIENT
Start: 2025-06-27 | End: 2025-06-27 | Stop reason: HOSPADM

## 2025-06-27 RX ORDER — PANTOPRAZOLE SODIUM 40 MG/1
40 TABLET, DELAYED RELEASE ORAL DAILY
Status: DISCONTINUED | OUTPATIENT
Start: 2025-06-27 | End: 2025-06-27 | Stop reason: HOSPADM

## 2025-06-27 RX ADMIN — ROPINIROLE HYDROCHLORIDE 1 MG: 1 TABLET, FILM COATED ORAL at 08:28

## 2025-06-27 RX ADMIN — ESCITALOPRAM OXALATE 20 MG: 20 TABLET ORAL at 08:28

## 2025-06-27 RX ADMIN — PANTOPRAZOLE SODIUM 40 MG: 40 TABLET, DELAYED RELEASE ORAL at 08:28

## 2025-06-27 RX ADMIN — METOPROLOL TARTRATE 25 MG: 25 TABLET, FILM COATED ORAL at 08:28

## 2025-06-27 RX ADMIN — Medication 21 PERCENT: at 07:24

## 2025-06-27 RX ADMIN — PREGABALIN 300 MG: 75 CAPSULE ORAL at 08:28

## 2025-06-27 RX ADMIN — TICAGRELOR 90 MG: 90 TABLET ORAL at 08:28

## 2025-06-27 RX ADMIN — DOCUSATE SODIUM 100 MG: 100 CAPSULE, LIQUID FILLED ORAL at 08:28

## 2025-06-27 RX ADMIN — TICAGRELOR 180 MG: 90 TABLET ORAL at 00:18

## 2025-06-27 ASSESSMENT — ENCOUNTER SYMPTOMS
ABDOMINAL DISTENTION: 0
COUGH: 0
SHORTNESS OF BREATH: 0
PALPITATIONS: 0
FEVER: 0
ABDOMINAL PAIN: 0
VOMITING: 1
WEAKNESS: 0
CHILLS: 0
NAUSEA: 1
DIZZINESS: 1

## 2025-06-27 ASSESSMENT — COGNITIVE AND FUNCTIONAL STATUS - GENERAL
DAILY ACTIVITIY SCORE: 24
MOBILITY SCORE: 24

## 2025-06-27 ASSESSMENT — PAIN SCALES - GENERAL: PAINLEVEL_OUTOF10: 0 - NO PAIN

## 2025-06-27 ASSESSMENT — PAIN - FUNCTIONAL ASSESSMENT: PAIN_FUNCTIONAL_ASSESSMENT: 0-10

## 2025-06-27 NOTE — CONSULTS
Inpatient consult to Cardiology  Consult performed by: Rose Durbin, EDGAR-CNP  Consult ordered by: Demario Smith MD  Reason for consult: chest pain, elevated troponins          History Of Present Illness  Camacho Thomas is a 59 y.o. male presenting with complaints of chest pain. He states he was laying there when he developed midsternal chest pain which was worse than his normal that he gets almost every time he lays down. He states he turned to his left side which made it worse and when he went back on his back the pain was so bad he got dizzy, had an emesis and his arms were tingling. He does report pain when he would take a deep breath. When EMS arrived his BP was 94/61, HR 48 and 10 minutes later his BP improved to 149/88, HR 59 without intervention.     Lab work in the ER showed glucose 112, sodium 137, potassium 3.7, BUN/creatinine 12/0.93, magnesium 1.82, troponin 26, WBCs 9.7, H&H 12.2/34.1, CTA of the chest negative for PE or aneurysm.    EKG showed sinus rhythm with J-point elevation to all leads.    He was started on a heparin drip, aspirin and Brilinta.    CRP today was noted to be 4.33, troponin up to 32 and downtrending to 30.      Past Medical History  Medical History[1]  GERD, tobacco use, hiatal hernia    Surgical History  Surgical History[2]     Social History  He reports that he has been smoking cigarettes. He has never used smokeless tobacco. No history on file for alcohol use and drug use.    Family History  Family History[3]     Allergies  Patient has no known allergies.    Review of Systems  Review of Systems   Constitutional:  Negative for chills and fever.   Respiratory:  Negative for cough and shortness of breath.    Cardiovascular:  Positive for chest pain. Negative for palpitations and leg swelling.   Gastrointestinal:  Positive for nausea and vomiting. Negative for abdominal distention and abdominal pain.   Musculoskeletal:  Negative for gait problem.   Neurological:   "Positive for dizziness. Negative for weakness.   All other systems reviewed and are negative.         Physical Exam  Constitutional: Well developed, awake/alert/oriented x3, no distress, alert and cooperative  Eyes: PERRL, EOMI, clear sclera  ENMT: mucous membranes moist, no apparent injury, no lesions seen  Head/Neck: Neck supple, no apparent injury, thyroid without mass or tenderness, No JVD, trachea midline, no bruits  Respiratory/Thorax: Patent airways, CTAB, normal breath sounds with good chest expansion, thorax symmetric  Cardiovascular: Regular, rate and rhythm, no murmurs, 2+ equal pulses of the extremities, normal S 1and S 2  Gastrointestinal: Nondistended, soft, non-tender, no rebound tenderness or guarding, no masses palpable, no organomegaly, +BS, no bruits  Musculoskeletal: ROM intact, no joint swelling, normal strength  Extremities: normal extremities, no cyanosis edema, contusions or wounds, no clubbing  Neurological: alert and oriented x3, intact senses, motor, response and reflexes, normal strength  Lymphatic: No significant lymphadenopathy  Psychological: Appropriate mood and behavior  Skin: Warm and dry, no lesions, no rashes       Last Recorded Vitals  Blood pressure 126/76, pulse 54, temperature 36.9 °C (98.4 °F), temperature source Temporal, resp. rate 18, height 1.854 m (6' 1\"), weight 76.8 kg (169 lb 5 oz), SpO2 96%.    Medications  Scheduled medications  Scheduled Medications[4]  Continuous medications  Continuous Medications[5]  PRN medications  PRN Medications[6]    Relevant Results  Results for orders placed or performed during the hospital encounter of 06/26/25 (from the past 24 hours)   CBC and Auto Differential   Result Value Ref Range    WBC 9.7 4.4 - 11.3 x10*3/uL    nRBC 0.0 0.0 - 0.0 /100 WBCs    RBC 3.82 (L) 4.50 - 5.90 x10*6/uL    Hemoglobin 12.2 (L) 13.5 - 17.5 g/dL    Hematocrit 34.1 (L) 41.0 - 52.0 %    MCV 89 80 - 100 fL    MCH 31.9 26.0 - 34.0 pg    MCHC 35.8 32.0 - 36.0 " g/dL    RDW 12.9 11.5 - 14.5 %    Platelets 193 150 - 450 x10*3/uL    Neutrophils % 65.6 40.0 - 80.0 %    Immature Granulocytes %, Automated 0.4 0.0 - 0.9 %    Lymphocytes % 24.9 13.0 - 44.0 %    Monocytes % 7.5 2.0 - 10.0 %    Eosinophils % 1.3 0.0 - 6.0 %    Basophils % 0.3 0.0 - 2.0 %    Neutrophils Absolute 6.38 1.20 - 7.70 x10*3/uL    Immature Granulocytes Absolute, Automated 0.04 0.00 - 0.70 x10*3/uL    Lymphocytes Absolute 2.43 1.20 - 4.80 x10*3/uL    Monocytes Absolute 0.73 0.10 - 1.00 x10*3/uL    Eosinophils Absolute 0.13 0.00 - 0.70 x10*3/uL    Basophils Absolute 0.03 0.00 - 0.10 x10*3/uL   Comprehensive Metabolic Panel   Result Value Ref Range    Glucose 112 (H) 74 - 99 mg/dL    Sodium 137 136 - 145 mmol/L    Potassium 3.7 3.5 - 5.3 mmol/L    Chloride 102 98 - 107 mmol/L    Bicarbonate 28 21 - 32 mmol/L    Anion Gap 11 10 - 20 mmol/L    Urea Nitrogen 12 6 - 23 mg/dL    Creatinine 0.93 0.50 - 1.30 mg/dL    eGFR >90 >60 mL/min/1.73m*2    Calcium 8.8 8.6 - 10.3 mg/dL    Albumin 4.1 3.4 - 5.0 g/dL    Alkaline Phosphatase 63 33 - 136 U/L    Total Protein 7.0 6.4 - 8.2 g/dL    AST 19 9 - 39 U/L    Bilirubin, Total 0.7 0.0 - 1.2 mg/dL    ALT 16 10 - 52 U/L   Magnesium   Result Value Ref Range    Magnesium 1.82 1.60 - 2.40 mg/dL   Troponin I, High Sensitivity, Initial   Result Value Ref Range    Troponin I, High Sensitivity 26 (H) 0 - 20 ng/L   ECG 12 lead   Result Value Ref Range    Ventricular Rate 61 BPM    Atrial Rate 61 BPM    CT Interval 220 ms    QRS Duration 100 ms    QT Interval 434 ms    QTC Calculation(Bazett) 436 ms    P Axis 84 degrees    R Axis 63 degrees    T Axis -5 degrees    QRS Count 10 beats    Q Onset 220 ms    P Onset 110 ms    P Offset 161 ms    T Offset 437 ms    QTC Fredericia 436 ms   Troponin, High Sensitivity, 1 Hour   Result Value Ref Range    Troponin I, High Sensitivity 32 (H) 0 - 20 ng/L   Coagulation Screen   Result Value Ref Range    Protime 11.8 9.8 - 12.4 seconds    INR 1.1 0.9  - 1.1    aPTT 27 26 - 36 seconds   ECG 12 lead   Result Value Ref Range    Ventricular Rate 58 BPM    Atrial Rate 58 BPM    NM Interval 200 ms    QRS Duration 100 ms    QT Interval 438 ms    QTC Calculation(Bazett) 429 ms    P Axis 81 degrees    R Axis 67 degrees    T Axis 12 degrees    QRS Count 9 beats    Q Onset 220 ms    P Onset 120 ms    P Offset 175 ms    T Offset 439 ms    QTC Fredericia 432 ms   Coagulation Screen   Result Value Ref Range    Protime 12.6 (H) 9.8 - 12.4 seconds    INR 1.1 0.9 - 1.1    aPTT 63 (H) 26 - 36 seconds   Heparin Assay, UFH   Result Value Ref Range    Heparin Unfractionated 0.5 See Comment Below for Therapeutic Ranges IU/mL   CBC   Result Value Ref Range    WBC 9.2 4.4 - 11.3 x10*3/uL    nRBC 0.0 0.0 - 0.0 /100 WBCs    RBC 3.98 (L) 4.50 - 5.90 x10*6/uL    Hemoglobin 12.6 (L) 13.5 - 17.5 g/dL    Hematocrit 35.3 (L) 41.0 - 52.0 %    MCV 89 80 - 100 fL    MCH 31.7 26.0 - 34.0 pg    MCHC 35.7 32.0 - 36.0 g/dL    RDW 12.8 11.5 - 14.5 %    Platelets 200 150 - 450 x10*3/uL   Basic Metabolic Panel   Result Value Ref Range    Glucose 93 74 - 99 mg/dL    Sodium 140 136 - 145 mmol/L    Potassium 3.9 3.5 - 5.3 mmol/L    Chloride 107 98 - 107 mmol/L    Bicarbonate 27 21 - 32 mmol/L    Anion Gap 10 10 - 20 mmol/L    Urea Nitrogen 11 6 - 23 mg/dL    Creatinine 0.75 0.50 - 1.30 mg/dL    eGFR >90 >60 mL/min/1.73m*2    Calcium 8.8 8.6 - 10.3 mg/dL   Lipid Panel   Result Value Ref Range    Cholesterol 95 0 - 199 mg/dL    HDL-Cholesterol 38.8 mg/dL    Cholesterol/HDL Ratio 2.4     LDL Calculated 47 <=99 mg/dL    VLDL 9 0 - 40 mg/dL    Triglycerides 45 0 - 149 mg/dL    Non HDL Cholesterol 56 0 - 149 mg/dL   Heparin Assay   Result Value Ref Range    Heparin Unfractionated 0.3 See Comment Below for Therapeutic Ranges IU/mL   Troponin I, High Sensitivity   Result Value Ref Range    Troponin I, High Sensitivity 30 (H) 0 - 20 ng/L   C-Reactive Protein   Result Value Ref Range    C-Reactive Protein 4.33 (H)  <1.00 mg/dL   Sedimentation rate, automated   Result Value Ref Range    Sedimentation Rate 8 0 - 20 mm/h   Transthoracic Echo Complete   Result Value Ref Range    BSA 1.99 m2       Transthoracic Echo Complete         CT angio chest abdomen pelvis   Final Result   1. No thoracic or abdominal aortic aneurysm or acute aortic pathology.   2. No acute abnormality of the chest, abdomen or pelvis.        MACRO:   None        Signed by: Robby Alva 6/26/2025 10:00 PM   Dictation workstation:   [id number]          No echocardiogram results found for the past 12 months       Assessment/Plan   Echocardiogram from 2021:   1. The left ventricular systolic function is normal with a 60-65% estimated ejection fraction.   2. Spectral Doppler shows an impaired relaxation pattern of left ventricular diastolic filling.   3. There is trace mitral and tricuspid regurgitation.    LHC in 2021 showed minimal CAD      Chest pain, elevated troponins, Pericarditis  -Troponins flat  -EKG reviewed, J point elevation in all leads, concerning for pericarditis  -CRP 4.33, sed rate 8  -LHC as above  -Echo pending  -Cont asa  -Stop brilinta, heparin gtt, statin  -Start Colchicine 0.6mg BID  -If pain ongoing, can consider a cardiac MRI  -Has stress test scheduled next month    2. GERD  -PPI  -Follow up with GI as scheduled    3. Tobacco use  -advised to quit  -Cont Chanjiex      Rose Durbin, EDGAR-CNP         [1]   Past Medical History:  Diagnosis Date    Other specified health status     No pertinent past medical history    Personal history of other diseases of the circulatory system 05/29/2019    History of mitral valve prolapse   [2] History reviewed. No pertinent surgical history.  [3] No family history on file.  [4] [START ON 6/28/2025] aspirin, 81 mg, oral, Daily  atomoxetine, 40 mg, oral, Daily  atorvastatin, 80 mg, oral, Nightly  docusate sodium, 100 mg, oral, BID  escitalopram, 20 mg, oral, Daily  metoprolol tartrate, 25 mg, oral,  q12h MICHELLE  oxygen, , inhalation, Continuous - Inhalation  pantoprazole, 40 mg, oral, Daily  pregabalin, 300 mg, oral, BID  rOPINIRole, 1 mg, oral, TID  ticagrelor, 90 mg, oral, BID  varenicline tartrate, 1 mg, oral, BID  [5] heparin, 0-4,000 Units/hr, Last Rate: 1,000 Units/hr (06/27/25 1133)  [6] PRN medications: acetaminophen **OR** acetaminophen **OR** acetaminophen, heparin, hydrOXYzine HCL

## 2025-06-27 NOTE — DISCHARGE SUMMARY
Discharge Diagnosis  Pericarditis        Issues Requiring Follow-Up  Post hospital follow up    Discharge Meds     Medication List      START taking these medications     aspirin 81 mg chewable tablet; Chew and swallow 1 tablet (81 mg) once   daily. Do not fill before June 28, 2025.; Start taking on: June 28, 2025   colchicine 0.6 mg tablet; Take 1 tablet (0.6 mg) by mouth 2 times a day.     CONTINUE taking these medications     atomoxetine 40 mg capsule; Commonly known as: Strattera   escitalopram 20 mg tablet; Commonly known as: Lexapro; Take 1 tablet (20   mg) by mouth once daily.   hydrOXYzine HCL 25 mg tablet; Commonly known as: Atarax   One-A-Day Men's Multivitamin 400- mcg tablet; Generic drug:   multivit-min-folic-vit K-lycop   pantoprazole 40 mg EC tablet; Commonly known as: ProtoNix; Take 1 tablet   (40 mg) by mouth once daily.   pregabalin 300 mg capsule; Commonly known as: Lyrica; TAKE 1 CAPSULE BY   MOUTH TWICE A DAY   rOPINIRole 1 mg tablet; Commonly known as: Requip; Take 1 tablet (1 mg)   by mouth 3 times a day. TAKE 1-2 TABLETS BY MOUTH AT BEDTIME AS NEEDED   varenicline tartrate 1 mg tablet; Commonly known as: Chantix; Take 1   tablet (1 mg) by mouth 2 times a day.     STOP taking these medications     chlorhexidine 0.12 % solution; Commonly known as: Peridex       Test Results Pending At Discharge  Pending Labs       No current pending labs.            Hospital Course   Camacho Thomas is a 59 y.o. male with history of tobacco use disorder presenting with chest pain. Cardio was consulted. They thought pt has pericarditis. He is started on ASA and colchicine. Symptoms improved. Pt is hemodynamically stable for discharge at this time with close outpatient follow ups.     The patient was discharged in satisfactory condition.   Medications and side effect profile reviewed with patient.  More than 30 minutes were spent in coordinating patient discharge     Pertinent Physical Exam At Time of  Discharge  Physical Exam  Vitals and nursing note reviewed.   Constitutional:       General: He is not in acute distress.     Appearance: Normal appearance. He is not ill-appearing or toxic-appearing.   HENT:      Head: Normocephalic and atraumatic.      Nose: Nose normal.      Mouth/Throat:      Mouth: Mucous membranes are moist.   Eyes:      Extraocular Movements: Extraocular movements intact.      Conjunctiva/sclera: Conjunctivae normal.      Pupils: Pupils are equal, round, and reactive to light.   Cardiovascular:      Rate and Rhythm: Normal rate and regular rhythm.      Heart sounds: No murmur heard.     No gallop.   Pulmonary:      Effort: Pulmonary effort is normal. No respiratory distress.      Breath sounds: Normal breath sounds. No wheezing, rhonchi or rales.   Abdominal:      General: Abdomen is flat. Bowel sounds are normal. There is no distension.      Palpations: Abdomen is soft. There is no mass.      Tenderness: There is no abdominal tenderness.   Musculoskeletal:         General: No swelling or tenderness. Normal range of motion.      Cervical back: Normal range of motion and neck supple.   Skin:     General: Skin is warm and dry.   Neurological:      General: No focal deficit present.      Mental Status: He is alert and oriented to person, place, and time. Mental status is at baseline.   Psychiatric:         Mood and Affect: Mood normal.         Behavior: Behavior normal.         Thought Content: Thought content normal.         Judgment: Judgment normal.     Outpatient Follow-Up  Future Appointments   Date Time Provider Department Center   7/1/2025  3:00 PM Melba Molina MD [id number]   7/9/2025  4:15 PM GE[facility] CT 1 [facility]         Shanel Humphries MD

## 2025-06-27 NOTE — PROGRESS NOTES
06/27/25 1112   Discharge Planning   Living Arrangements Spouse/significant other   Support Systems Spouse/significant other   Assistance Needed patient is A&Ox4; independent in ADL's, no AD, no DME; drives  PCP Dr. Holcomb   Type of Residence Private residence   Number of Stairs to Enter Residence 4   Number of Stairs Within Residence 13   Who is requesting discharge planning? Provider   Home or Post Acute Services None   Expected Discharge Disposition Home  (denies home going needs)

## 2025-06-27 NOTE — ED PROVIDER NOTES
CC: Chest Pain     HPI:  Patient is a 59-year-old male with a history of restless leg syndrome, anxiety, peripheral neuropathy GERD, COPD and chronic tobacco use disorder presenting to the emergency department for chest pain.  Started this morning.  Described as a sharp pain this morning.  He states he feels like his bones are bruised.  However when EMS arrived he was diaphoretic pale and hypotensive.  Their initial rhythm strip concerning for STEMI.  They brought him out to the ambulance bay and repeat EKG showed some depressions in lateral leads with concerns for mild elevation in T wave inversions in inferior leads therefore nitro was held.  They did give aspirin.  Given the sharp nature of the pain held off on heparin and Brilinta with concern for potential dissection.  Chest pain currently a 5 out of 10.    Records Reviewed:  Recent available ED and inpatient notes reviewed in EMR.    PMHx/PSHx:  Per HPI.   - has a past medical history of Other specified health status and Personal history of other diseases of the circulatory system.  - has no past surgical history on file.  - has Anemia; Asymptomatic microscopic hematuria; Benign neoplasm of pharynx; BPH (benign prostatic hyperplasia); CAD (coronary artery disease); Colon polyp; Generalized anxiety disorder; History of adenomatous polyp of colon; Lesion of uvula; Lung nodules; Thyroid dysfunction; Vitamin B12 deficiency; Restless legs syndrome; Mitral valve prolapse; Mild chronic obstructive pulmonary disease (Multi); Hypertrophy of breast; Cigarette smoker; and NSTEMI (non-ST elevated myocardial infarction) (Multi) on their problem list.    Medications:  Reviewed in EMR. See EMR for complete list of medications and doses.    Allergies:  Pantoprazole    Social History:  - Tobacco:  reports that he has been smoking cigarettes. He has never used smokeless tobacco.   - Alcohol:  has no history on file for alcohol use.   - Illicit Drugs:  has no history on file for  drug use.     ROS:  Per HPI.       ???????????????????????????????????????????????????????????????  Triage Vitals:  T 36.3 °C (97.3 °F)  HR 61  /78  RR 18  O2 100 % None (Room air)    Physical Exam  ???????????????????????????????????????????????????????????????  GEN: overall well appearing, no acute distress  HEAD: atraumatic  EYES: PERRL, EOMI, no scleral icterus  ENT: mmm  NECK: no JVD  CVS/CHEST: reg rate, nl rhythm  PULM: CTA b/l no wheezes, crackles, or rhonchi   GI: soft, NT/ND, no rebound or guarding   EXT: no LE edema, 2+ periph pulses in bilat radial and DP   NEURO: Awake and alert, Strength and sensation is equal in b/l upper and lower extremities  SKIN: warm, dry  PSYCH: AAOx3 answers questions appropriately    EKG:  As interpreted by me EKG read by me reviewed by me is sinus rhythm with a first-degree heart block.  Normal axis.  No significant ST segment elevation but does have hyperacute T waves and T wave inversions inferiorly.  Repeat EKG is unchanged.  Will continue to observe and repeat closely.    Assessment and Plan:  Received 324 of aspirin prior to arrival.  Presents the emergency department chest pain.  EKG is concerning for ischemia.  Reviewed with cardiology who recommended cardiac workup.  Patient follows with Dr. Concepcion.  Given the sharp nature of the pain as well as the pain with breathing will obtain CT angio to rule out dissection or large pulmonary embolism.  Patient will require admission and likely need cardiac cath if CT is reassuring in the emergency department.  CT reassuring.  Started on heparin drip.  Admitted to medicine with cardiology on consult.  Patient follows with Dr. Concepcion.  Dr. Malcolm's recommendations documented hospital course and discussed with inpatient team.    ED Course:  ED Course as of 06/26/25 2224   Thu Jun 26, 2025 2116 Spoke to Dr. Malcolm who reviewed all imaging and does not believe it is a STEMI.  Recommended further chest pain workup with  potential CTA. [HD]   2153 Troponin I, High Sensitivity(!): 26 [HD]   2222 Repeat EKG sinus bradycardia 58 bpm.  Normal axis.  T wave inversions inferiorly with Q waves noted in anterior septal leads. [HD]   2224 appropriate to treat as ACS, DAPT, heparin, statin and BB If tolerated, consult in house cardio [HD]      ED Course User Index  [HD] Magalie Vasques DO         Diagnoses as of 06/26/25 2224   NSTEMI (non-ST elevated myocardial infarction) (Multi)       Disposition:  admitted    Magalie Vasques DO      Critical Care    Performed by: Magalie Vasques DO  Authorized by: Magalie Vasques DO    Critical care provider statement:     Critical care time (minutes):  40    Critical care time was exclusive of:  Separately billable procedures and treating other patients and teaching time    Critical care was necessary to treat or prevent imminent or life-threatening deterioration of the following conditions: NSTEMI.    Critical care was time spent personally by me on the following activities:  Discussions with consultants, blood draw for specimens, development of treatment plan with patient or surrogate, evaluation of patient's response to treatment, ordering and performing treatments and interventions, ordering and review of laboratory studies, ordering and review of radiographic studies, pulse oximetry, re-evaluation of patient's condition and review of old charts    Care discussed with: admitting provider     ? [employer] last updated 6/26/2025 10:24 PM        Magalie Vasques DO  06/26/25 2224

## 2025-06-27 NOTE — PROGRESS NOTES
"Camacho Thomas is a 59 y.o. male on day 1 of admission presenting with NSTEMI (non-ST elevated myocardial infarction) (Multi).    Subjective   Pt says CP improved.        Objective     Physical Exam  Vitals and nursing note reviewed.   Constitutional:       General: He is not in acute distress.     Appearance: Normal appearance. He is not ill-appearing or toxic-appearing.   HENT:      Head: Normocephalic and atraumatic.      Nose: Nose normal.      Mouth/Throat:      Mouth: Mucous membranes are moist.   Eyes:      Extraocular Movements: Extraocular movements intact.      Conjunctiva/sclera: Conjunctivae normal.      Pupils: Pupils are equal, round, and reactive to light.   Cardiovascular:      Rate and Rhythm: Normal rate and regular rhythm.      Heart sounds: No murmur heard.     No gallop.   Pulmonary:      Effort: Pulmonary effort is normal. No respiratory distress.      Breath sounds: Normal breath sounds. No wheezing, rhonchi or rales.   Abdominal:      General: Abdomen is flat. Bowel sounds are normal. There is no distension.      Palpations: Abdomen is soft. There is no mass.      Tenderness: There is no abdominal tenderness.   Musculoskeletal:         General: No swelling or tenderness. Normal range of motion.      Cervical back: Normal range of motion and neck supple.   Skin:     General: Skin is warm and dry.   Neurological:      General: No focal deficit present.      Mental Status: He is alert and oriented to person, place, and time. Mental status is at baseline.   Psychiatric:         Mood and Affect: Mood normal.         Behavior: Behavior normal.         Thought Content: Thought content normal.         Judgment: Judgment normal.         Last Recorded Vitals:  /75 (BP Location: Left arm, Patient Position: Lying)   Pulse 63   Temp 36.8 °C (98.2 °F) (Temporal)   Resp 18   Ht 1.854 m (6' 1\")   Wt 76.8 kg (169 lb 5 oz)   SpO2 97%   BMI 22.34 kg/m²      Scheduled medications:  Scheduled " Medications[1]  Continuous medications:  Continuous Medications[2]  PRN medications:  PRN Medications[3]     Relevant Results:  Results for orders placed or performed during the hospital encounter of 06/26/25 (from the past 24 hours)   CBC and Auto Differential   Result Value Ref Range    WBC 9.7 4.4 - 11.3 x10*3/uL    nRBC 0.0 0.0 - 0.0 /100 WBCs    RBC 3.82 (L) 4.50 - 5.90 x10*6/uL    Hemoglobin 12.2 (L) 13.5 - 17.5 g/dL    Hematocrit 34.1 (L) 41.0 - 52.0 %    MCV 89 80 - 100 fL    MCH 31.9 26.0 - 34.0 pg    MCHC 35.8 32.0 - 36.0 g/dL    RDW 12.9 11.5 - 14.5 %    Platelets 193 150 - 450 x10*3/uL    Neutrophils % 65.6 40.0 - 80.0 %    Immature Granulocytes %, Automated 0.4 0.0 - 0.9 %    Lymphocytes % 24.9 13.0 - 44.0 %    Monocytes % 7.5 2.0 - 10.0 %    Eosinophils % 1.3 0.0 - 6.0 %    Basophils % 0.3 0.0 - 2.0 %    Neutrophils Absolute 6.38 1.20 - 7.70 x10*3/uL    Immature Granulocytes Absolute, Automated 0.04 0.00 - 0.70 x10*3/uL    Lymphocytes Absolute 2.43 1.20 - 4.80 x10*3/uL    Monocytes Absolute 0.73 0.10 - 1.00 x10*3/uL    Eosinophils Absolute 0.13 0.00 - 0.70 x10*3/uL    Basophils Absolute 0.03 0.00 - 0.10 x10*3/uL   Comprehensive Metabolic Panel   Result Value Ref Range    Glucose 112 (H) 74 - 99 mg/dL    Sodium 137 136 - 145 mmol/L    Potassium 3.7 3.5 - 5.3 mmol/L    Chloride 102 98 - 107 mmol/L    Bicarbonate 28 21 - 32 mmol/L    Anion Gap 11 10 - 20 mmol/L    Urea Nitrogen 12 6 - 23 mg/dL    Creatinine 0.93 0.50 - 1.30 mg/dL    eGFR >90 >60 mL/min/1.73m*2    Calcium 8.8 8.6 - 10.3 mg/dL    Albumin 4.1 3.4 - 5.0 g/dL    Alkaline Phosphatase 63 33 - 136 U/L    Total Protein 7.0 6.4 - 8.2 g/dL    AST 19 9 - 39 U/L    Bilirubin, Total 0.7 0.0 - 1.2 mg/dL    ALT 16 10 - 52 U/L   Magnesium   Result Value Ref Range    Magnesium 1.82 1.60 - 2.40 mg/dL   Troponin I, High Sensitivity, Initial   Result Value Ref Range    Troponin I, High Sensitivity 26 (H) 0 - 20 ng/L   ECG 12 lead   Result Value Ref Range     Ventricular Rate 61 BPM    Atrial Rate 61 BPM    NM Interval 220 ms    QRS Duration 100 ms    QT Interval 434 ms    QTC Calculation(Bazett) 436 ms    P Axis 84 degrees    R Axis 63 degrees    T Axis -5 degrees    QRS Count 10 beats    Q Onset 220 ms    P Onset 110 ms    P Offset 161 ms    T Offset 437 ms    QTC Fredericia 436 ms   Troponin, High Sensitivity, 1 Hour   Result Value Ref Range    Troponin I, High Sensitivity 32 (H) 0 - 20 ng/L   Coagulation Screen   Result Value Ref Range    Protime 11.8 9.8 - 12.4 seconds    INR 1.1 0.9 - 1.1    aPTT 27 26 - 36 seconds   ECG 12 lead   Result Value Ref Range    Ventricular Rate 58 BPM    Atrial Rate 58 BPM    NM Interval 200 ms    QRS Duration 100 ms    QT Interval 438 ms    QTC Calculation(Bazett) 429 ms    P Axis 81 degrees    R Axis 67 degrees    T Axis 12 degrees    QRS Count 9 beats    Q Onset 220 ms    P Onset 120 ms    P Offset 175 ms    T Offset 439 ms    QTC Fredericia 432 ms   Coagulation Screen   Result Value Ref Range    Protime 12.6 (H) 9.8 - 12.4 seconds    INR 1.1 0.9 - 1.1    aPTT 63 (H) 26 - 36 seconds   Heparin Assay, UFH   Result Value Ref Range    Heparin Unfractionated 0.5 See Comment Below for Therapeutic Ranges IU/mL   CBC   Result Value Ref Range    WBC 9.2 4.4 - 11.3 x10*3/uL    nRBC 0.0 0.0 - 0.0 /100 WBCs    RBC 3.98 (L) 4.50 - 5.90 x10*6/uL    Hemoglobin 12.6 (L) 13.5 - 17.5 g/dL    Hematocrit 35.3 (L) 41.0 - 52.0 %    MCV 89 80 - 100 fL    MCH 31.7 26.0 - 34.0 pg    MCHC 35.7 32.0 - 36.0 g/dL    RDW 12.8 11.5 - 14.5 %    Platelets 200 150 - 450 x10*3/uL   Basic Metabolic Panel   Result Value Ref Range    Glucose 93 74 - 99 mg/dL    Sodium 140 136 - 145 mmol/L    Potassium 3.9 3.5 - 5.3 mmol/L    Chloride 107 98 - 107 mmol/L    Bicarbonate 27 21 - 32 mmol/L    Anion Gap 10 10 - 20 mmol/L    Urea Nitrogen 11 6 - 23 mg/dL    Creatinine 0.75 0.50 - 1.30 mg/dL    eGFR >90 >60 mL/min/1.73m*2    Calcium 8.8 8.6 - 10.3 mg/dL   Lipid Panel   Result  Value Ref Range    Cholesterol 95 0 - 199 mg/dL    HDL-Cholesterol 38.8 mg/dL    Cholesterol/HDL Ratio 2.4     LDL Calculated 47 <=99 mg/dL    VLDL 9 0 - 40 mg/dL    Triglycerides 45 0 - 149 mg/dL    Non HDL Cholesterol 56 0 - 149 mg/dL   Heparin Assay   Result Value Ref Range    Heparin Unfractionated 0.3 See Comment Below for Therapeutic Ranges IU/mL   Troponin I, High Sensitivity   Result Value Ref Range    Troponin I, High Sensitivity 30 (H) 0 - 20 ng/L   C-Reactive Protein   Result Value Ref Range    C-Reactive Protein 4.33 (H) <1.00 mg/dL   Sedimentation rate, automated   Result Value Ref Range    Sedimentation Rate 8 0 - 20 mm/h       Imaging  CT angio chest abdomen pelvis  Result Date: 6/26/2025  1. No thoracic or abdominal aortic aneurysm or acute aortic pathology. 2. No acute abnormality of the chest, abdomen or pelvis.   MACRO: None   Signed by: Robby Alva 6/26/2025 10:00 PM Dictation workstation:   [id number]      Cardiology, Vascular, and Other Imaging  ECG 12 lead  Result Date: 6/27/2025  Sinus bradycardia Poor R-wave progression ; consider anterior infarct, lead placement, or normal variant Abnormal ECG When compared with ECG of 26-JUN-2025 20:50, (unconfirmed) No significant change was found    ECG 12 lead  Result Date: 6/27/2025  Sinus rhythm with 1st degree AV block Poor R-wave progression ; consider anterior infarct, lead placement, or normal variant T wave abnormality, consider inferior ischemia Abnormal ECG When compared with ECG of 20-SEP-2021 21:53, Premature ventricular complexes are no longer Present NH interval has increased T wave inversion now evident in Inferior leads                       Assessment/Plan   Assessment & Plan  NSTEMI (non-ST elevated myocardial infarction) (Multi)    NSTEMI  - cardio consulted  - hep gtt  - statin  - ASA/brilinta  - metoprolol    Normocytic anemia  - monitor    GERD  - ppi    Neuropathy/RLS  - lyrica  - requip    Depression/anxiety  -  lexapro    ADHD  - strattera    Nicotine abuse  - chantix    Diet: NPO  Dispo: monitor clinically          Shanel Humphries MD  Hospitalist           [1] [START ON 6/28/2025] aspirin, 81 mg, oral, Daily  atomoxetine, 40 mg, oral, Daily  atorvastatin, 80 mg, oral, Nightly  docusate sodium, 100 mg, oral, BID  escitalopram, 20 mg, oral, Daily  metoprolol tartrate, 25 mg, oral, q12h MICHELLE  oxygen, , inhalation, Continuous - Inhalation  pantoprazole, 40 mg, oral, Daily  pregabalin, 300 mg, oral, BID  rOPINIRole, 1 mg, oral, TID  ticagrelor, 90 mg, oral, BID  varenicline tartrate, 1 mg, oral, BID  [2] heparin, 0-4,000 Units/hr, Last Rate: 1,000 Units/hr (06/27/25 0927)  [3] PRN medications: acetaminophen **OR** acetaminophen **OR** acetaminophen, heparin, hydrOXYzine HCL

## 2025-06-27 NOTE — H&P
"History Of Present Illness  Camacho Thomas is a 59 y.o. male with history of tobacco use disorder presenting with chest pain.  He says he got up this morning with substernal chest pain and it has been present all day long.  He describes it as an elephant sitting on his chest although he mentioned it being pleuritic as well. He mentions having lightheadedness and felt like he was going to pass out.  He had nausea, vomiting, shortness of breath, and diaphoresis. No palpitations. He presented to the ED via EMS.  EKG in ED showed normal sinus rhythm with first-degree AV block.  T wave inversions were seen in leads III and aVF.     Past Medical History  Medical History[1]    Past Surgical History  Surgical History[2]     Social History  He reports that he has been smoking cigarettes. He has never used smokeless tobacco. No history on file for alcohol use and drug use.    Family History  Positive for DM and malignant neoplasm     Allergies  Pantoprazole    Medications  Current Outpatient Medications   Medication Instructions    atomoxetine (STRATTERA) 40 mg, Daily    chlorhexidine (Peridex) 0.12 % solution PLEASE SEE ATTACHED FOR DETAILED DIRECTIONS    escitalopram (LEXAPRO) 20 mg, oral, Daily    hydrOXYzine HCL (Atarax) 25 mg tablet 1 tablet, oral, 3 times daily PRN    multivit-min-folic-vit K-lycop (One-A-Day Men's Multivitamin) 400- mcg tablet 1 tablet, Daily    pantoprazole (PROTONIX) 40 mg, oral, Daily    pregabalin (LYRICA) 300 mg, oral, 2 times daily    rOPINIRole (REQUIP) 1 mg, oral, 3 times daily, TAKE 1-2 TABLETS BY MOUTH AT BEDTIME AS NEEDED    varenicline tartrate (CHANTIX) 1 mg, oral, 2 times daily       Review of Systems     Physical Exam     Last Recorded Vitals  Blood pressure 127/78, pulse 61, temperature 36.3 °C (97.3 °F), temperature source Temporal, resp. rate 18, height 1.854 m (6' 1\"), weight 81 kg (178 lb 9.2 oz), SpO2 100%.    Relevant Results      Latest Reference Range & Units 06/26/25 " 21:11 06/26/25 22:16   GLUCOSE 74 - 99 mg/dL 112 (H)    SODIUM 136 - 145 mmol/L 137    POTASSIUM 3.5 - 5.3 mmol/L 3.7    CHLORIDE 98 - 107 mmol/L 102    Bicarbonate 21 - 32 mmol/L 28    Anion Gap 10 - 20 mmol/L 11    Blood Urea Nitrogen 6 - 23 mg/dL 12    Creatinine 0.50 - 1.30 mg/dL 0.93    EGFR >60 mL/min/1.73m*2 >90    Calcium 8.6 - 10.3 mg/dL 8.8    Albumin 3.4 - 5.0 g/dL 4.1    Alkaline Phosphatase 33 - 136 U/L 63    ALT 10 - 52 U/L 16    AST 9 - 39 U/L 19    Bilirubin Total 0.0 - 1.2 mg/dL 0.7    Total Protein 6.4 - 8.2 g/dL 7.0    MAGNESIUM 1.60 - 2.40 mg/dL 1.82    Troponin I, High Sensitivity 0 - 20 ng/L 26 (H) 32 (H)   Protime 9.8 - 12.4 seconds  11.8   INR 0.9 - 1.1   1.1   aPTT 26 - 36 seconds  27   WBC 4.4 - 11.3 x10*3/uL 9.7    nRBC 0.0 - 0.0 /100 WBCs 0.0    RBC 4.50 - 5.90 x10*6/uL 3.82 (L)    HEMOGLOBIN 13.5 - 17.5 g/dL 12.2 (L)    HEMATOCRIT 41.0 - 52.0 % 34.1 (L)    MCV 80 - 100 fL 89    MCH 26.0 - 34.0 pg 31.9    MCHC 32.0 - 36.0 g/dL 35.8    RED CELL DISTRIBUTION WIDTH 11.5 - 14.5 % 12.9    Platelets 150 - 450 x10*3/uL 193    Neutrophils % 40.0 - 80.0 % 65.6    Immature Granulocytes %, Automated 0.0 - 0.9 % 0.4    Lymphocytes % 13.0 - 44.0 % 24.9    Monocytes % 2.0 - 10.0 % 7.5    Eosinophils % 0.0 - 6.0 % 1.3    Basophils % 0.0 - 2.0 % 0.3    Neutrophils Absolute 1.20 - 7.70 x10*3/uL 6.38    Immature Granulocytes Absolute, Automated 0.00 - 0.70 x10*3/uL 0.04    Lymphocytes Absolute 1.20 - 4.80 x10*3/uL 2.43    Monocytes Absolute 0.10 - 1.00 x10*3/uL 0.73    Eosinophils Absolute 0.00 - 0.70 x10*3/uL 0.13    Basophils Absolute 0.00 - 0.10 x10*3/uL 0.03    (H): Data is abnormally high  (L): Data is abnormally low  Assessment & Plan  NSTEMI (non-ST elevated myocardial infarction) (Multi)    Acute coronary syndrome  - Admit to tele  - Serial EKGs and cardiac enzymes  - DAPT and HI Statin therapy  - IV Heparin  - Beta blocker  - Check 2D ECHO  - Cardiology consulted    GERD/Hiatal hernia  - Resume  PPI    I spent 75 minutes in the professional and overall care of this patient.      Demario Smith MD         [1]   Past Medical History:  Diagnosis Date    Other specified health status     No pertinent past medical history    Personal history of other diseases of the circulatory system 05/29/2019    History of mitral valve prolapse   [2] History reviewed. No pertinent surgical history.

## 2025-06-27 NOTE — ED TRIAGE NOTES
Pt presented to the ED with c/o CP. Pt states it started early in the day and was hoping it would go away but did not resolve. Pt was seen by outpatient cards and was told he had a indigestion. Pt states pain was 10/10 mid sternal with no radiation. He described pain as sharp and felt pressure in his chest. He now rates pain 5/10 with the same sensation. EMS EKG showed ST depression but upon arrival was 1st degree AV block. Pt has had a cath before but no stent placed. Pt placed on cardiac monitor.

## 2025-06-27 NOTE — CARE PLAN
The patient's goals for the shift include  patient will remain safe and comfortable throughout shift.     The clinical goals for the shift include patient will have no complaints of chest pain throughout shift.      Problem: Pain - Adult  Goal: Verbalizes/displays adequate comfort level or baseline comfort level  Outcome: Progressing     Problem: Safety - Adult  Goal: Free from fall injury  Outcome: Progressing     Problem: Discharge Planning  Goal: Discharge to home or other facility with appropriate resources  Outcome: Progressing     Problem: Chronic Conditions and Co-morbidities  Goal: Patient's chronic conditions and co-morbidity symptoms are monitored and maintained or improved  Outcome: Progressing     Problem: Nutrition  Goal: Nutrient intake appropriate for maintaining nutritional needs  Outcome: Progressing

## 2025-06-30 ENCOUNTER — PATIENT OUTREACH (OUTPATIENT)
Dept: PRIMARY CARE | Facility: CLINIC | Age: 60
End: 2025-06-30
Payer: COMMERCIAL

## 2025-06-30 ENCOUNTER — TELEPHONE (OUTPATIENT)
Dept: PRIMARY CARE | Facility: CLINIC | Age: 60
End: 2025-06-30
Payer: COMMERCIAL

## 2025-06-30 NOTE — TELEPHONE ENCOUNTER
Called, and spoke with patient.  He is still wanting to wait until after 7/16/2025 to schedule the follow-up with Dr ART.  Pt was encouraged to call the office if he changes his mind, or if he has any concerns.

## 2025-06-30 NOTE — PROGRESS NOTES
Discharge Facility: [facility]   Discharge Diagnosis: Pericarditis   Admission Date: 6/26/25  Discharge Date: 6/27/25    PCP Appointment Date: Declined, until after all  upcoming procedures on 16th of July.   Specialist Appointment Date: Appointment with Melba Molina MD (07/01/2025) - Gen Surg EGD   Hospital Encounter and Summary Linked: ED to Hosp-Admission (Discharged) with Shanel Humphries MD; Magalie Vsaques DO (06/26/2025)     Discharge Summary by Shanel Humphries MD (06/27/2025 14:02)   See discharge assessment below for further details    Wrap Up  Wrap Up Additional Comments: Pt. Reports he is doing well at home. He denies any chest pain, SOB, n/v. He states he has no medication questions but his wife does, referral to [facility] pharmacy for the pt. Pt. States he would just like for someone to tell him what is wrong with him. Denies further questions/concerns at this time. This callers contact information for non-emergent questions/concerns. (6/30/2025 11:41 AM)    Engagement  Call Start Time: -- (Call completed with Camacho) (6/30/2025 11:41 AM)    Medications  Medications reviewed with patient/caregiver?: Yes (6/30/2025 11:41 AM)  Is the patient having any side effects they believe may be caused by any medication additions or changes?: No (6/30/2025 11:41 AM)  Does the patient have all medications ordered at discharge?: Yes (6/30/2025 11:41 AM)  Care Management Interventions: No intervention needed (6/30/2025 11:41 AM)  Prescription Comments: aspirin 81 mg chewable tablet; Chew and swallow 1 tablet (81 mg) once   daily.  Start taking on: June 28, 2025   colchicine 0.6 mg tablet; Take 1 tablet (0.6 mg) by mouth 2 times a day. (6/30/2025 11:41 AM)  Is the patient taking all medications as directed (includes completed medication regime)?: Yes (6/30/2025 11:41 AM)  Care Management Interventions: Provided patient education; Notified pharmacy for assistance (6/30/2025 11:41 AM)  Medication Comments: Referall to [facility] pharmacy -  Medication questions (6/30/2025 11:41 AM)    Appointments  Does the patient have a primary care provider?: Yes (6/30/2025 11:41 AM)  Care Management Interventions: Educated patient on importance of making appointment; Advised patient to make appointment (6/30/2025 11:41 AM)  Has the patient kept scheduled appointments due by today?: Not applicable (6/30/2025 11:41 AM)    Self Management  What is the home health agency?: n/a (6/30/2025 11:41 AM)  Has home health visited the patient within 72 hours of discharge?: Not applicable (6/30/2025 11:41 AM)  What Durable Medical Equipment (DME) was ordered?: n/a (6/30/2025 11:41 AM)    Patient Teaching  Does the patient have access to their discharge instructions?: Yes (6/30/2025 11:41 AM)  Care Management Interventions: Reviewed instructions with patient (6/30/2025 11:41 AM)  What is the patient's perception of their health status since discharge?: Improving (6/30/2025 11:41 AM)  Is the patient/caregiver able to teach back the hierarchy of who to call/visit for symptoms/problems? PCP, Specialist, Home Health nurse, Urgent Care, ED, 911: Yes (6/30/2025 11:41 AM)  Patient/Caregiver Education Comments: Pt. denies any new concerns post hospital discharge (6/30/2025 11:41 AM)

## 2025-06-30 NOTE — TELEPHONE ENCOUNTER
----- Message from Maury Zerto sent at 6/30/2025 11:31 AM EDT -----  Regarding: TCM/ NO appt / Declined  Patient declined to make an appt with PCP during outreach for TCM call. Patient states they prefer to     PCP Appointment Date: Declined, until after all  upcoming procedures Last is on 7/16     Can you please contact pt to schedule hosp follow up within 14 days of discharge if PCP wishes to see the patient? 7/10/25    Discharge Facility: [facility] Diagnosis: Pericarditis   Admission Date: 6/26/25  Discharge Date: 6/27/25      Thank you

## 2025-07-01 ENCOUNTER — APPOINTMENT (OUTPATIENT)
Dept: SURGERY | Facility: CLINIC | Age: 60
End: 2025-07-01
Payer: COMMERCIAL

## 2025-07-01 VITALS
DIASTOLIC BLOOD PRESSURE: 70 MMHG | WEIGHT: 177.1 LBS | OXYGEN SATURATION: 96 % | BODY MASS INDEX: 23.37 KG/M2 | SYSTOLIC BLOOD PRESSURE: 115 MMHG | HEART RATE: 52 BPM

## 2025-07-01 DIAGNOSIS — R07.2 RETROSTERNAL CHEST PAIN: Primary | ICD-10-CM

## 2025-07-01 DIAGNOSIS — R07.9 CHEST PAIN, UNSPECIFIED TYPE: ICD-10-CM

## 2025-07-01 PROCEDURE — 99214 OFFICE O/P EST MOD 30 MIN: CPT | Performed by: SURGERY

## 2025-07-02 NOTE — SIGNIFICANT EVENT
Follow up phone call: no contact made with patient. Voicemail left on patient phone with a number to call me back.

## 2025-07-04 NOTE — PROGRESS NOTES
General Surgery Office Follow Up    Chief Complaint:  Follow up     Interval History:  Camacho Thomas is a 59 y.o. male who came today for follow up.   History of chronic chest pain  Cardiology believe his symptoms were related to hiatal hernia   Was seen back in last January  Based on imaging study CT, I however did not see obvious hiatal hernia   EGD was recommended then. He did not schedule it.   He was recently again admitted for chest pain. Cardiology consulted and thought he might have pericarditis. His pain improved with ASA and colchicine. His pain was at midsternal location. Pain worsens when lying down and turn to left side. He was recommended to follow up on his GERD too.   Per patient, his heart burn is currently well controlled with protonix.   He denies regurgitation and dysphagia.     Past Medical History:  Medical History[1]     Past Surgical History:  Surgical History[2]     Medications:  Current Outpatient Medications   Medication Instructions    aspirin 81 mg, oral, Daily    atomoxetine (STRATTERA) 40 mg, Daily    colchicine 0.6 mg, oral, 2 times daily    escitalopram (LEXAPRO) 20 mg, oral, Daily    hydrOXYzine HCL (Atarax) 25 mg tablet 1 tablet, oral, 3 times daily PRN    multivit-min-folic-vit K-lycop (One-A-Day Men's Multivitamin) 400- mcg tablet 1 tablet, Daily    pantoprazole (PROTONIX) 40 mg, oral, Daily    pregabalin (LYRICA) 300 mg, oral, 2 times daily    rOPINIRole (REQUIP) 1 mg, oral, 3 times daily, TAKE 1-2 TABLETS BY MOUTH AT BEDTIME AS NEEDED    varenicline tartrate (CHANTIX) 1 mg, oral, 2 times daily        Allergies:  RX Allergies[3]     Family History:  Family History[4]     Social History:  Social History[5]     Review of Systems:  A complete 12 point review of systems was performed. It is otherwise negative except as noted in the above interval history.     Vital Signs:  Vitals:    07/01/25 1500   BP: 115/70   Pulse: 52   SpO2: 96%      Body mass index is 23.37  kg/m².      Physical Exam:  General: No acute distress.   Neuro: Alert and oriented ×3. Follows commands.  Face: Atraumatic, no visible skin lesion.   Head: Atraumatic  Eyes: Pupils equal reactive to light. Extraocular motions intact.  Ears: Hears normal speaking voice.  Mouth, Nose, Throat: Mucous membranes moist.   Neck: Supple. No visible masses.  Breast: Not examined.   Lung: Patent airway and no labored breath.   Abdomen: soft, NT, ND.   Rectal and Perianal: Not examined.   Genitourinary: Not examined.   Musculoskeletal: Moves all extremities.    Extremities: No cyanosis.   Skin: No rashes or lesions.  Psychological: Normal affect      Laboratory Values:  CBC:   Lab Results   Component Value Date    WBC 9.2 06/27/2025    WBC 8.5 05/30/2025    RBC 3.98 (L) 06/27/2025    RBC 4.26 05/30/2025    HGB 12.6 (L) 06/27/2025    HGB 13.4 05/30/2025    HCT 35.3 (L) 06/27/2025    HCT 39.2 05/30/2025     06/27/2025     05/30/2025       RFP:   Lab Results   Component Value Date     06/27/2025     05/30/2025    K 3.9 06/27/2025    K 4.3 05/30/2025     06/27/2025     05/30/2025    CO2 27 06/27/2025    CO2 28 05/30/2025    BUN 11 06/27/2025    BUN 12 05/30/2025    CREATININE 0.75 06/27/2025    CREATININE 0.82 05/30/2025    CALCIUM 8.8 06/27/2025    CALCIUM 8.9 05/30/2025    MG 1.82 06/26/2025        LFTs:   Lab Results   Component Value Date    PROT 7.0 06/26/2025    PROT 7.0 05/30/2025    ALBUMIN 4.1 06/26/2025    ALBUMIN 4.3 05/30/2025    BILITOT 0.7 06/26/2025    BILITOT 0.8 05/30/2025    ALKPHOS 63 06/26/2025    ALKPHOS 57 05/30/2025    AST 19 06/26/2025    AST 29 05/30/2025    ALT 16 06/26/2025    ALT 19 05/30/2025            Imaging:  I have personally reviewed the images and the radiologist's report.  Imaging  No results found.    Cardiology, Vascular, and Other Imaging  Transthoracic Echo Complete  Result Date: 6/27/2025   [facility], [address] [place], [place]  [address]               Tel [phone] and Fax [phone] TRANSTHORACIC ECHOCARDIOGRAM REPORT  Patient Name:       KAITLIN BACA  Reading Physician:    [id number] Howard Sparks MD Study Date:         6/27/2025           Ordering Provider:    [id number] MERCYLONA RAUSCH MRN/PID:            [id number]            Fellow: Accession#:         [id number]        Nurse: Date of Birth/Age:  1965 / 59      Sonographer:          Senia braden RDCS Gender assigned at  M                   Additional Staff: Birth: Height:             185.42 cm           Admit Date: Weight:             76.66 kg            Admission Status:     Inpatient -                                                               Routine BSA / BMI:          2.00 m2 / 22.30     Encounter#:           [id number]                     kg/m2 Blood Pressure:     133/75 mmHg         Department Location:  [facility] Non                                                               Invasive Study Type:    TRANSTHORACIC ECHO (TTE) COMPLETE Diagnosis/ICD: Non ST elevation (NSTEMI) myocardial infarction-I21.4 Indication:    Acute Coronary Syndrome: Non-STEMI CPT Code:      Echo Complete w Full [id number] Patient History: Smoker:            Current. Pertinent History: Chest Pain and Dyspnea. Study Detail: The following Echo studies were performed: 2D, M-Mode, Doppler and               color flow. The patient was awake.  PHYSICIAN INTERPRETATION: Left Ventricle: The left ventricular systolic function is normal with a visually estimated ejection fraction of 55%. There are no regional left ventricular wall motion abnormalities. The left ventricular cavity size is normal. There is normal septal and normal posterior left ventricular wall thickness. Spectral Doppler shows a normal pattern of  left ventricular diastolic filling. Left Atrium: The left atrial size is normal. Right Ventricle: The right ventricle is normal in size. There is normal right ventricular global systolic function. Right Atrium: The right atrium is normal in size. Aortic Valve: The aortic valve is trileaflet. The aortic valve area by VTI is 2.56 cmï¿½ with a peak velocity of 1.26 m/s. The peak and mean gradients are 6 mmHg and 4 mmHg, respectively with a dimensionless index of 0.82. There is no evidence of aortic valve regurgitation. Mitral Valve: The mitral valve is normal in structure. There is trace mitral valve regurgitation. The E Vmax is 0.58 m/s. Tricuspid Valve: The tricuspid valve is structurally normal. There is mild tricuspid regurgitation. The Doppler estimated right ventricular systolic pressure (RVSP) is within normal limits at 15 mmHg. Pulmonic Valve: The pulmonic valve is structurally normal. There is trace pulmonic valve regurgitation. Pericardium: There is no pericardial effusion noted. Aorta: The aortic root is normal. There is moderate dilatation of the ascending aorta. Pulmonary Artery: The tricuspid regurgitant velocity is 1.76 m/s, and with an estimated right atrial pressure of 3, the estimated pulmonary artery pressure is normal with the RVSP at 15 mmHg. Systemic Veins: The inferior vena cava appears normal in size, with IVC inspiratory collapse greater than 50%.  CONCLUSIONS:  1. The left ventricular systolic function is normal with a visually estimated ejection fraction of 55%.  2. There is normal right ventricular global systolic function.  3. The Doppler estimated RVSP is within normal limits at 15 mmHg.  4. There is mild tricuspid regurgitation.  5. There is trace mitral and pulmonic regurgitation.  6. There is moderate dilatation of the ascending aorta (4.5cm). QUANTITATIVE DATA SUMMARY:  2D MEASUREMENTS:          Normal Ranges: Ao Root d:       3.60 cm  (2.0-3.7cm) IVSd:            0.82 cm  (0.6-1.1cm)  LVPWd:           0.96 cm  (0.6-1.1cm) LVIDd:           5.30 cm  (3.9-5.9cm) LVIDs:           3.75 cm LV Mass Index:   86 g/m2 LVEDV Index:     54 ml/m2 LV % FS          29.2 %  LEFT ATRIUM:                  Normal Ranges: LA Vol A4C:        40.3 ml    (22+/-6mL/m2) LA Vol A2C:        40.7 ml LA Vol BP:         41.2 ml LA Vol Index A4C:  20.1ml/m2 LA Vol Index A2C:  20.3 ml/m2 LA Vol Index BP:   20.6 ml/m2 LA Area A4C:       16.7 cm2 LA Area A2C:       16.5 cm2 LA Major Axis A4C: 5.9 cm LA Major Axis A2C: 5.7 cm LA Volume Index:   39.1 ml/m2 LA Vol A4C:        36.7 ml LA Vol A2C:        40.3 ml LA Vol Index BSA:  19.2 ml/m2  RIGHT ATRIUM:          Normal Ranges: RA Area A4C:  16.3 cm2  M-MODE MEASUREMENTS:         Normal Ranges: Ao Root:             3.80 cm (2.0-3.7cm) LAs:                 2.90 cm (2.7-4.0cm)  AORTA MEASUREMENTS:         Normal Ranges: Ao Sinus, d:        3.60 cm (2.1-3.5cm) Asc Ao, d:          4.50 cm (2.1-3.4cm)  LV SYSTOLIC FUNCTION:                      Normal Ranges: EF-A4C View:    55 % (>=55%) EF-A2C View:    55 % EF-Biplane:     56 % EF-Visual:      55 % LV EF Reported: 55 %  LV DIASTOLIC FUNCTION:             Normal Ranges: MV Peak E:             0.58 m/s    (0.7-1.2 m/s) MV Peak A:             0.71 m/s    (0.42-0.7 m/s) E/A Ratio:             0.81        (1.0-2.2) MV e'                  0.083 m/s   (>8.0) MV lateral e'          0.08 m/s MV medial e'           0.08 m/s MV A Dur:              158.00 msec E/e' Ratio:            6.91        (<8.0) PulmV Sys Randy:         46.40 cm/s PulmV Echevarria Randy:        36.80 cm/s PulmV S/D Randy:         1.30 PulmV A Revs Randy:      22.60 cm/s PulmV A Revs Dur:      148.00 msec  MITRAL VALVE:          Normal Ranges: MV DT:        231 msec (150-240msec)  AORTIC VALVE:                     Normal Ranges: AoV Vmax:                1.26 m/s (<=1.7m/s) AoV Peak P.4 mmHg (<20mmHg) AoV Mean P.0 mmHg (1.7-11.5mmHg) LVOT Max Randy:             1.02 m/s (<=1.1m/s) AoV VTI:                 27.10 cm (18-25cm) LVOT VTI:                22.10 cm LVOT Diameter:           2.00 cm  (1.8-2.4cm) AoV Area, VTI:           2.56 cm2 (2.5-5.5cm2) AoV Area,Vmax:           2.54 cm2 (2.5-4.5cm2) AoV Dimensionless Index: 0.82  RIGHT VENTRICLE: RV Basal 2.61 cm RV Mid   1.89 cm RV Major 7.4 cm TAPSE:   23.0 mm RV s'    0.11 m/s  TRICUSPID VALVE/RVSP:          Normal Ranges: Peak TR Velocity:     1.76 m/s Est. RA Pressure:     3 RV Syst Pressure:     15       (< 30mmHg) IVC Diam:             1.93 cm  PULMONIC VALVE:          Normal Ranges: PV Max Randy:     0.8 m/s  (0.6-0.9m/s) PV Max P.6 mmHg  PULMONARY VEINS: PulmV A Revs Dur: 148.00 msec PulmV A Revs Randy: 22.60 cm/s PulmV Echevarria Randy:   36.80 cm/s PulmV S/D Randy:    1.30 PulmV Sys Randy:    46.40 cm/s  [id number] Howard Sparks MD Electronically signed on 2025 at 4:45:03 PM  ** Final **           Assessment:  This is a 59 y.o. male who has   Chronic chest pain: mid sternal   On protonix, heart burn controlled   Cardiology workup: ? Pericarditiis   Needs to rule out GERD/HH as etiology for his pain     Plan:  Will scheduled EGD for further evaluation   Explained possible need pH study  Further recommendation after above   Continue protonix for now       Melba Molina MD [facility]  General Surgery  Office: [phone]  Fax:     [phone]  11:14 AM   25          [1]   Past Medical History:  Diagnosis Date    Other specified health status     No pertinent past medical history    Personal history of other diseases of the circulatory system 2019    History of mitral valve prolapse   [2] No past surgical history on file.  [3] No Known Allergies  [4] No family history on file.  [5]   Social History  Socioeconomic History    Marital status:    Tobacco Use    Smoking status: Every Day     Current packs/day: 0.50     Types: Cigarettes    Smokeless tobacco: Never     Social Drivers of Health     Financial Resource  Strain: Low Risk  (6/26/2025)    Overall Financial Resource Strain (CARDIA)     Difficulty of Paying Living Expenses: Not very hard   Food Insecurity: No Food Insecurity (6/26/2025)    Hunger Vital Sign     Worried About Running Out of Food in the Last Year: Never true     Ran Out of Food in the Last Year: Never true   Transportation Needs: No Transportation Needs (6/26/2025)    PRAPARE - Transportation     Lack of Transportation (Medical): No     Lack of Transportation (Non-Medical): No   Intimate Partner Violence: Not At Risk (6/26/2025)    Humiliation, Afraid, Rape, and Kick questionnaire     Fear of Current or Ex-Partner: No     Emotionally Abused: No     Physically Abused: No     Sexually Abused: No   Housing Stability: Low Risk  (6/26/2025)    Housing Stability Vital Sign     Unable to Pay for Housing in the Last Year: No     Number of Times Moved in the Last Year: 0     Homeless in the Last Year: No

## 2025-07-04 NOTE — H&P (VIEW-ONLY)
General Surgery Office Follow Up    Chief Complaint:  Follow up     Interval History:  Camacho Thomas is a 59 y.o. male who came today for follow up.   History of chronic chest pain  Cardiology believe his symptoms were related to hiatal hernia   Was seen back in last January  Based on imaging study CT, I however did not see obvious hiatal hernia   EGD was recommended then. He did not schedule it.   He was recently again admitted for chest pain. Cardiology consulted and thought he might have pericarditis. His pain improved with ASA and colchicine. His pain was at midsternal location. Pain worsens when lying down and turn to left side. He was recommended to follow up on his GERD too.   Per patient, his heart burn is currently well controlled with protonix.   He denies regurgitation and dysphagia.     Past Medical History:  Medical History[1]     Past Surgical History:  Surgical History[2]     Medications:  Current Outpatient Medications   Medication Instructions    aspirin 81 mg, oral, Daily    atomoxetine (STRATTERA) 40 mg, Daily    colchicine 0.6 mg, oral, 2 times daily    escitalopram (LEXAPRO) 20 mg, oral, Daily    hydrOXYzine HCL (Atarax) 25 mg tablet 1 tablet, oral, 3 times daily PRN    multivit-min-folic-vit K-lycop (One-A-Day Men's Multivitamin) 400- mcg tablet 1 tablet, Daily    pantoprazole (PROTONIX) 40 mg, oral, Daily    pregabalin (LYRICA) 300 mg, oral, 2 times daily    rOPINIRole (REQUIP) 1 mg, oral, 3 times daily, TAKE 1-2 TABLETS BY MOUTH AT BEDTIME AS NEEDED    varenicline tartrate (CHANTIX) 1 mg, oral, 2 times daily        Allergies:  RX Allergies[3]     Family History:  Family History[4]     Social History:  Social History[5]     Review of Systems:  A complete 12 point review of systems was performed. It is otherwise negative except as noted in the above interval history.     Vital Signs:  Vitals:    07/01/25 1500   BP: 115/70   Pulse: 52   SpO2: 96%      Body mass index is 23.37  kg/m².      Physical Exam:  General: No acute distress.   Neuro: Alert and oriented ×3. Follows commands.  Face: Atraumatic, no visible skin lesion.   Head: Atraumatic  Eyes: Pupils equal reactive to light. Extraocular motions intact.  Ears: Hears normal speaking voice.  Mouth, Nose, Throat: Mucous membranes moist.   Neck: Supple. No visible masses.  Breast: Not examined.   Lung: Patent airway and no labored breath.   Abdomen: soft, NT, ND.   Rectal and Perianal: Not examined.   Genitourinary: Not examined.   Musculoskeletal: Moves all extremities.    Extremities: No cyanosis.   Skin: No rashes or lesions.  Psychological: Normal affect      Laboratory Values:  CBC:   Lab Results   Component Value Date    WBC 9.2 06/27/2025    WBC 8.5 05/30/2025    RBC 3.98 (L) 06/27/2025    RBC 4.26 05/30/2025    HGB 12.6 (L) 06/27/2025    HGB 13.4 05/30/2025    HCT 35.3 (L) 06/27/2025    HCT 39.2 05/30/2025     06/27/2025     05/30/2025       RFP:   Lab Results   Component Value Date     06/27/2025     05/30/2025    K 3.9 06/27/2025    K 4.3 05/30/2025     06/27/2025     05/30/2025    CO2 27 06/27/2025    CO2 28 05/30/2025    BUN 11 06/27/2025    BUN 12 05/30/2025    CREATININE 0.75 06/27/2025    CREATININE 0.82 05/30/2025    CALCIUM 8.8 06/27/2025    CALCIUM 8.9 05/30/2025    MG 1.82 06/26/2025        LFTs:   Lab Results   Component Value Date    PROT 7.0 06/26/2025    PROT 7.0 05/30/2025    ALBUMIN 4.1 06/26/2025    ALBUMIN 4.3 05/30/2025    BILITOT 0.7 06/26/2025    BILITOT 0.8 05/30/2025    ALKPHOS 63 06/26/2025    ALKPHOS 57 05/30/2025    AST 19 06/26/2025    AST 29 05/30/2025    ALT 16 06/26/2025    ALT 19 05/30/2025            Imaging:  I have personally reviewed the images and the radiologist's report.  Imaging  No results found.    Cardiology, Vascular, and Other Imaging  Transthoracic Echo Complete  Result Date: 6/27/2025   [facility], [address], [place]  [address]               Tel [phone] and Fax [phone] TRANSTHORACIC ECHOCARDIOGRAM REPORT  Patient Name:       KAITLIN BACA  Reading Physician:    [id number] Howard Sparks MD Study Date:         6/27/2025           Ordering Provider:    [id number] MERCYLONA RAUSCH MRN/PID:            [id number]            Fellow: Accession#:         [id number]        Nurse: Date of Birth/Age:  1965 / 59      Sonographer:          Senia braden RDCS Gender assigned at  M                   Additional Staff: Birth: Height:             185.42 cm           Admit Date: Weight:             76.66 kg            Admission Status:     Inpatient -                                                               Routine BSA / BMI:          2.00 m2 / 22.30     Encounter#:           [id number]                     kg/m2 Blood Pressure:     133/75 mmHg         Department Location:  [facility] Non                                                               Invasive Study Type:    TRANSTHORACIC ECHO (TTE) COMPLETE Diagnosis/ICD: Non ST elevation (NSTEMI) myocardial infarction-I21.4 Indication:    Acute Coronary Syndrome: Non-STEMI CPT Code:      Echo Complete w Full [id number] Patient History: Smoker:            Current. Pertinent History: Chest Pain and Dyspnea. Study Detail: The following Echo studies were performed: 2D, M-Mode, Doppler and               color flow. The patient was awake.  PHYSICIAN INTERPRETATION: Left Ventricle: The left ventricular systolic function is normal with a visually estimated ejection fraction of 55%. There are no regional left ventricular wall motion abnormalities. The left ventricular cavity size is normal. There is normal septal and normal posterior left ventricular wall thickness. Spectral Doppler shows a normal pattern of  left ventricular diastolic filling. Left Atrium: The left atrial size is normal. Right Ventricle: The right ventricle is normal in size. There is normal right ventricular global systolic function. Right Atrium: The right atrium is normal in size. Aortic Valve: The aortic valve is trileaflet. The aortic valve area by VTI is 2.56 cmï¿½ with a peak velocity of 1.26 m/s. The peak and mean gradients are 6 mmHg and 4 mmHg, respectively with a dimensionless index of 0.82. There is no evidence of aortic valve regurgitation. Mitral Valve: The mitral valve is normal in structure. There is trace mitral valve regurgitation. The E Vmax is 0.58 m/s. Tricuspid Valve: The tricuspid valve is structurally normal. There is mild tricuspid regurgitation. The Doppler estimated right ventricular systolic pressure (RVSP) is within normal limits at 15 mmHg. Pulmonic Valve: The pulmonic valve is structurally normal. There is trace pulmonic valve regurgitation. Pericardium: There is no pericardial effusion noted. Aorta: The aortic root is normal. There is moderate dilatation of the ascending aorta. Pulmonary Artery: The tricuspid regurgitant velocity is 1.76 m/s, and with an estimated right atrial pressure of 3, the estimated pulmonary artery pressure is normal with the RVSP at 15 mmHg. Systemic Veins: The inferior vena cava appears normal in size, with IVC inspiratory collapse greater than 50%.  CONCLUSIONS:  1. The left ventricular systolic function is normal with a visually estimated ejection fraction of 55%.  2. There is normal right ventricular global systolic function.  3. The Doppler estimated RVSP is within normal limits at 15 mmHg.  4. There is mild tricuspid regurgitation.  5. There is trace mitral and pulmonic regurgitation.  6. There is moderate dilatation of the ascending aorta (4.5cm). QUANTITATIVE DATA SUMMARY:  2D MEASUREMENTS:          Normal Ranges: Ao Root d:       3.60 cm  (2.0-3.7cm) IVSd:            0.82 cm  (0.6-1.1cm)  LVPWd:           0.96 cm  (0.6-1.1cm) LVIDd:           5.30 cm  (3.9-5.9cm) LVIDs:           3.75 cm LV Mass Index:   86 g/m2 LVEDV Index:     54 ml/m2 LV % FS          29.2 %  LEFT ATRIUM:                  Normal Ranges: LA Vol A4C:        40.3 ml    (22+/-6mL/m2) LA Vol A2C:        40.7 ml LA Vol BP:         41.2 ml LA Vol Index A4C:  20.1ml/m2 LA Vol Index A2C:  20.3 ml/m2 LA Vol Index BP:   20.6 ml/m2 LA Area A4C:       16.7 cm2 LA Area A2C:       16.5 cm2 LA Major Axis A4C: 5.9 cm LA Major Axis A2C: 5.7 cm LA Volume Index:   39.1 ml/m2 LA Vol A4C:        36.7 ml LA Vol A2C:        40.3 ml LA Vol Index BSA:  19.2 ml/m2  RIGHT ATRIUM:          Normal Ranges: RA Area A4C:  16.3 cm2  M-MODE MEASUREMENTS:         Normal Ranges: Ao Root:             3.80 cm (2.0-3.7cm) LAs:                 2.90 cm (2.7-4.0cm)  AORTA MEASUREMENTS:         Normal Ranges: Ao Sinus, d:        3.60 cm (2.1-3.5cm) Asc Ao, d:          4.50 cm (2.1-3.4cm)  LV SYSTOLIC FUNCTION:                      Normal Ranges: EF-A4C View:    55 % (>=55%) EF-A2C View:    55 % EF-Biplane:     56 % EF-Visual:      55 % LV EF Reported: 55 %  LV DIASTOLIC FUNCTION:             Normal Ranges: MV Peak E:             0.58 m/s    (0.7-1.2 m/s) MV Peak A:             0.71 m/s    (0.42-0.7 m/s) E/A Ratio:             0.81        (1.0-2.2) MV e'                  0.083 m/s   (>8.0) MV lateral e'          0.08 m/s MV medial e'           0.08 m/s MV A Dur:              158.00 msec E/e' Ratio:            6.91        (<8.0) PulmV Sys Randy:         46.40 cm/s PulmV Echevarria Randy:        36.80 cm/s PulmV S/D Randy:         1.30 PulmV A Revs Randy:      22.60 cm/s PulmV A Revs Dur:      148.00 msec  MITRAL VALVE:          Normal Ranges: MV DT:        231 msec (150-240msec)  AORTIC VALVE:                     Normal Ranges: AoV Vmax:                1.26 m/s (<=1.7m/s) AoV Peak P.4 mmHg (<20mmHg) AoV Mean P.0 mmHg (1.7-11.5mmHg) LVOT Max Randy:             1.02 m/s (<=1.1m/s) AoV VTI:                 27.10 cm (18-25cm) LVOT VTI:                22.10 cm LVOT Diameter:           2.00 cm  (1.8-2.4cm) AoV Area, VTI:           2.56 cm2 (2.5-5.5cm2) AoV Area,Vmax:           2.54 cm2 (2.5-4.5cm2) AoV Dimensionless Index: 0.82  RIGHT VENTRICLE: RV Basal 2.61 cm RV Mid   1.89 cm RV Major 7.4 cm TAPSE:   23.0 mm RV s'    0.11 m/s  TRICUSPID VALVE/RVSP:          Normal Ranges: Peak TR Velocity:     1.76 m/s Est. RA Pressure:     3 RV Syst Pressure:     15       (< 30mmHg) IVC Diam:             1.93 cm  PULMONIC VALVE:          Normal Ranges: PV Max Randy:     0.8 m/s  (0.6-0.9m/s) PV Max P.6 mmHg  PULMONARY VEINS: PulmV A Revs Dur: 148.00 msec PulmV A Revs Randy: 22.60 cm/s PulmV Echevarria Randy:   36.80 cm/s PulmV S/D Randy:    1.30 PulmV Sys Randy:    46.40 cm/s  [id number] Howard Sparks MD Electronically signed on 2025 at 4:45:03 PM  ** Final **           Assessment:  This is a 59 y.o. male who has   Chronic chest pain: mid sternal   On protonix, heart burn controlled   Cardiology workup: ? Pericarditiis   Needs to rule out GERD/HH as etiology for his pain     Plan:  Will scheduled EGD for further evaluation   Explained possible need pH study  Further recommendation after above   Continue protonix for now       Melba Molina MD [facility]  General Surgery  Office: [phone]  Fax:     [phone]  11:14 AM   25          [1]   Past Medical History:  Diagnosis Date    Other specified health status     No pertinent past medical history    Personal history of other diseases of the circulatory system 2019    History of mitral valve prolapse   [2] No past surgical history on file.  [3] No Known Allergies  [4] No family history on file.  [5]   Social History  Socioeconomic History    Marital status:    Tobacco Use    Smoking status: Every Day     Current packs/day: 0.50     Types: Cigarettes    Smokeless tobacco: Never     Social Drivers of Health     Financial Resource  Strain: Low Risk  (6/26/2025)    Overall Financial Resource Strain (CARDIA)     Difficulty of Paying Living Expenses: Not very hard   Food Insecurity: No Food Insecurity (6/26/2025)    Hunger Vital Sign     Worried About Running Out of Food in the Last Year: Never true     Ran Out of Food in the Last Year: Never true   Transportation Needs: No Transportation Needs (6/26/2025)    PRAPARE - Transportation     Lack of Transportation (Medical): No     Lack of Transportation (Non-Medical): No   Intimate Partner Violence: Not At Risk (6/26/2025)    Humiliation, Afraid, Rape, and Kick questionnaire     Fear of Current or Ex-Partner: No     Emotionally Abused: No     Physically Abused: No     Sexually Abused: No   Housing Stability: Low Risk  (6/26/2025)    Housing Stability Vital Sign     Unable to Pay for Housing in the Last Year: No     Number of Times Moved in the Last Year: 0     Homeless in the Last Year: No

## 2025-07-09 ENCOUNTER — HOSPITAL ENCOUNTER (OUTPATIENT)
Dept: RADIOLOGY | Facility: HOSPITAL | Age: 60
Discharge: HOME | End: 2025-07-09
Payer: COMMERCIAL

## 2025-07-09 DIAGNOSIS — F17.210 NICOTINE DEPENDENCE, CIGARETTES, UNCOMPLICATED: ICD-10-CM

## 2025-07-09 PROCEDURE — 71271 CT THORAX LUNG CANCER SCR C-: CPT

## 2025-07-14 ENCOUNTER — PATIENT OUTREACH (OUTPATIENT)
Dept: PRIMARY CARE | Facility: CLINIC | Age: 60
End: 2025-07-14
Payer: COMMERCIAL

## 2025-07-14 NOTE — PROGRESS NOTES
Confirmation of at least 2 patient identifiers.    Completed telephonic follow-up with patient approximately 14 days post discharge, no PCP follow up.   Spoke to patient during outreach call.    Patient reports feeling: Improved    Patient has questions or concerns about medications: No    Have all prescribed medications been filled? Yes    Patient has necessary resources to manage their care? Yes    Patient has questions or concerns? No    Next care management follow-up approximately within one month.  Care  information provided to patient.

## 2025-07-19 LAB
ATRIAL RATE: 58 BPM
ATRIAL RATE: 61 BPM
P AXIS: 81 DEGREES
P AXIS: 84 DEGREES
P OFFSET: 161 MS
P OFFSET: 175 MS
P ONSET: 110 MS
P ONSET: 120 MS
PR INTERVAL: 200 MS
PR INTERVAL: 220 MS
Q ONSET: 220 MS
Q ONSET: 220 MS
QRS COUNT: 10 BEATS
QRS COUNT: 9 BEATS
QRS DURATION: 100 MS
QRS DURATION: 100 MS
QT INTERVAL: 434 MS
QT INTERVAL: 438 MS
QTC CALCULATION(BAZETT): 429 MS
QTC CALCULATION(BAZETT): 436 MS
QTC FREDERICIA: 432 MS
QTC FREDERICIA: 436 MS
R AXIS: 63 DEGREES
R AXIS: 67 DEGREES
T AXIS: -5 DEGREES
T AXIS: 12 DEGREES
T OFFSET: 437 MS
T OFFSET: 439 MS
VENTRICULAR RATE: 58 BPM
VENTRICULAR RATE: 61 BPM

## 2025-07-23 ENCOUNTER — TELEPHONE (OUTPATIENT)
Dept: PREADMISSION TESTING | Facility: HOSPITAL | Age: 60
End: 2025-07-23
Payer: COMMERCIAL

## 2025-07-23 DIAGNOSIS — F17.210 CIGARETTE NICOTINE DEPENDENCE WITHOUT COMPLICATION: ICD-10-CM

## 2025-07-23 DIAGNOSIS — K21.9 GASTROESOPHAGEAL REFLUX DISEASE WITHOUT ESOPHAGITIS: ICD-10-CM

## 2025-07-23 DIAGNOSIS — G62.9 POLYNEUROPATHY, UNSPECIFIED: ICD-10-CM

## 2025-07-23 RX ORDER — AMOXICILLIN 500 MG/1
875 CAPSULE ORAL DAILY
COMMUNITY
End: 2025-07-25 | Stop reason: ALTCHOICE

## 2025-07-23 NOTE — TELEPHONE ENCOUNTER
SURGERY PRE-OPERATIVE INSTRUCTIONS    *You will receive a phone call the day before your procedure  after 2pm, (or the Friday before your surgery if scheduled on a Monday.) Generally the hospital will be calling you with this information after that time.    *If you are taking GLP1 medications for type 2 diabetes or weight loss, hold these medications on the day of the procedure. START a clear liquid diet 24 hours before your surgery. On the day of your surgery/procedure, STOP all clear liquids 2 hours before your arrival time to the hospital. A clear liquid diet consists of clear liquids and foods that melt into a clear liquid. Clear liquids can and should contain sugar. This is only if you are taking a GLP1 medication.     *You are not to eat after midnight the night before the surgery. You may have up to 10 ounces of clear liquids up until 2 hours prior to arriving to the hospital. The exception is with medications you were instructed to take day of surgery.     *You may take tylenol for pain/discomfort as needed.     *Stop taking all aspirin products, ibuprofen (motrin/advil), naproxen (aleve/naprosyn) for one week prior to surgery.    *Stop taking all vitamins and supplements one week prior to surgery.     *You should not have alcoholic beverages for 24 hours before surgery.     *You should not smoke 24 hours prior to surgery.     *To help prevent surgical infections bathe/shower with Dial soap the evening before surgery.    *You can wear deodorant but no lotion, powder, or perfume/cologne. You should remove all make-up and nail polish at home.    *If you wear glasses, please bring a case for the glasses with you.    *You will be asked to remove dentures and contacts.     *Please leave all valuables at home.    *You should wear loose, comfortable clothing that will accommodate bandages and/or casts.    *You should notify your doctor of any change in your condition (fever, cold, rash, etc). Surgery may need to be  re-scheduled until a time you are in better health.    *A responsible adult is required to accompany you to and from the hospital if you are receiving anesthesia or a sedative. Patients are not permitted to drive for 24 hours after anesthesia.     *You can use the [employer] parking if you wish.     *If you have any further questions please call [phone].

## 2025-07-24 ENCOUNTER — ANESTHESIA EVENT (OUTPATIENT)
Dept: OPERATING ROOM | Facility: HOSPITAL | Age: 60
End: 2025-07-24
Payer: COMMERCIAL

## 2025-07-24 RX ORDER — MEPERIDINE HYDROCHLORIDE 25 MG/ML
12.5 INJECTION INTRAMUSCULAR; INTRAVENOUS; SUBCUTANEOUS EVERY 10 MIN PRN
Status: CANCELLED | OUTPATIENT
Start: 2025-07-24

## 2025-07-24 RX ORDER — VARENICLINE TARTRATE 1 MG/1
1 TABLET, FILM COATED ORAL 2 TIMES DAILY
Qty: 180 TABLET | Refills: 0 | Status: SHIPPED | OUTPATIENT
Start: 2025-07-24

## 2025-07-24 RX ORDER — OXYCODONE HYDROCHLORIDE 5 MG/1
5 TABLET ORAL EVERY 4 HOURS PRN
Refills: 0 | Status: CANCELLED | OUTPATIENT
Start: 2025-07-24

## 2025-07-24 RX ORDER — PREGABALIN 300 MG/1
300 CAPSULE ORAL 2 TIMES DAILY
Qty: 174 CAPSULE | Refills: 1 | Status: SHIPPED | OUTPATIENT
Start: 2025-07-24

## 2025-07-24 RX ORDER — DROPERIDOL 2.5 MG/ML
0.62 INJECTION, SOLUTION INTRAMUSCULAR; INTRAVENOUS ONCE AS NEEDED
Status: CANCELLED | OUTPATIENT
Start: 2025-07-24

## 2025-07-24 RX ORDER — DIPHENHYDRAMINE HYDROCHLORIDE 50 MG/ML
12.5 INJECTION, SOLUTION INTRAMUSCULAR; INTRAVENOUS ONCE AS NEEDED
Status: CANCELLED | OUTPATIENT
Start: 2025-07-24

## 2025-07-24 RX ORDER — ONDANSETRON HYDROCHLORIDE 2 MG/ML
4 INJECTION, SOLUTION INTRAVENOUS ONCE AS NEEDED
Status: CANCELLED | OUTPATIENT
Start: 2025-07-24

## 2025-07-24 RX ORDER — ALBUTEROL SULFATE 0.83 MG/ML
2.5 SOLUTION RESPIRATORY (INHALATION) ONCE AS NEEDED
Status: CANCELLED | OUTPATIENT
Start: 2025-07-24

## 2025-07-24 RX ORDER — PANTOPRAZOLE SODIUM 40 MG/1
40 TABLET, DELAYED RELEASE ORAL DAILY
Qty: 90 TABLET | Refills: 3 | Status: SHIPPED | OUTPATIENT
Start: 2025-07-24

## 2025-07-24 RX ORDER — SODIUM CHLORIDE, SODIUM LACTATE, POTASSIUM CHLORIDE, CALCIUM CHLORIDE 600; 310; 30; 20 MG/100ML; MG/100ML; MG/100ML; MG/100ML
100 INJECTION, SOLUTION INTRAVENOUS CONTINUOUS
Status: CANCELLED | OUTPATIENT
Start: 2025-07-24 | End: 2025-07-25

## 2025-07-25 ENCOUNTER — TELEPHONE (OUTPATIENT)
Dept: PRIMARY CARE | Facility: CLINIC | Age: 60
End: 2025-07-25

## 2025-07-25 ENCOUNTER — HOSPITAL ENCOUNTER (OUTPATIENT)
Dept: OPERATING ROOM | Facility: HOSPITAL | Age: 60
Setting detail: OUTPATIENT SURGERY
Discharge: HOME | End: 2025-07-25
Payer: COMMERCIAL

## 2025-07-25 ENCOUNTER — ANESTHESIA (OUTPATIENT)
Dept: OPERATING ROOM | Facility: HOSPITAL | Age: 60
End: 2025-07-25
Payer: COMMERCIAL

## 2025-07-25 VITALS
OXYGEN SATURATION: 98 % | RESPIRATION RATE: 17 BRPM | HEIGHT: 74 IN | TEMPERATURE: 97 F | WEIGHT: 160.5 LBS | BODY MASS INDEX: 20.6 KG/M2 | HEART RATE: 62 BPM | SYSTOLIC BLOOD PRESSURE: 102 MMHG | DIASTOLIC BLOOD PRESSURE: 70 MMHG

## 2025-07-25 DIAGNOSIS — R07.2 RETROSTERNAL CHEST PAIN: ICD-10-CM

## 2025-07-25 DIAGNOSIS — R07.9 CHEST PAIN, UNSPECIFIED TYPE: ICD-10-CM

## 2025-07-25 PROCEDURE — 3600000002 HC OR TIME - INITIAL BASE CHARGE - PROCEDURE LEVEL TWO: Performed by: STUDENT IN AN ORGANIZED HEALTH CARE EDUCATION/TRAINING PROGRAM

## 2025-07-25 PROCEDURE — 43239 EGD BIOPSY SINGLE/MULTIPLE: CPT | Performed by: SURGERY

## 2025-07-25 PROCEDURE — 3700000001 HC GENERAL ANESTHESIA TIME - INITIAL BASE CHARGE: Performed by: STUDENT IN AN ORGANIZED HEALTH CARE EDUCATION/TRAINING PROGRAM

## 2025-07-25 PROCEDURE — 2500000004 HC RX 250 GENERAL PHARMACY W/ HCPCS (ALT 636 FOR OP/ED): Performed by: ANESTHESIOLOGY

## 2025-07-25 PROCEDURE — 7100000010 HC PHASE TWO TIME - EACH INCREMENTAL 1 MINUTE: Performed by: STUDENT IN AN ORGANIZED HEALTH CARE EDUCATION/TRAINING PROGRAM

## 2025-07-25 PROCEDURE — 3700000002 HC GENERAL ANESTHESIA TIME - EACH INCREMENTAL 1 MINUTE: Performed by: STUDENT IN AN ORGANIZED HEALTH CARE EDUCATION/TRAINING PROGRAM

## 2025-07-25 PROCEDURE — 7100000009 HC PHASE TWO TIME - INITIAL BASE CHARGE: Performed by: STUDENT IN AN ORGANIZED HEALTH CARE EDUCATION/TRAINING PROGRAM

## 2025-07-25 PROCEDURE — 3600000007 HC OR TIME - EACH INCREMENTAL 1 MINUTE - PROCEDURE LEVEL TWO: Performed by: STUDENT IN AN ORGANIZED HEALTH CARE EDUCATION/TRAINING PROGRAM

## 2025-07-25 PROCEDURE — 2500000004 HC RX 250 GENERAL PHARMACY W/ HCPCS (ALT 636 FOR OP/ED): Mod: JW | Performed by: NURSE ANESTHETIST, CERTIFIED REGISTERED

## 2025-07-25 PROCEDURE — 2500000001 HC RX 250 WO HCPCS SELF ADMINISTERED DRUGS (ALT 637 FOR MEDICARE OP): Performed by: NURSE ANESTHETIST, CERTIFIED REGISTERED

## 2025-07-25 RX ORDER — ALBUTEROL SULFATE 90 UG/1
INHALANT RESPIRATORY (INHALATION) AS NEEDED
Status: DISCONTINUED | OUTPATIENT
Start: 2025-07-25 | End: 2025-07-25

## 2025-07-25 RX ORDER — LIDOCAINE HCL/PF 100 MG/5ML
SYRINGE (ML) INTRAVENOUS AS NEEDED
Status: DISCONTINUED | OUTPATIENT
Start: 2025-07-25 | End: 2025-07-25

## 2025-07-25 RX ORDER — PROPOFOL 10 MG/ML
INJECTION, EMULSION INTRAVENOUS AS NEEDED
Status: DISCONTINUED | OUTPATIENT
Start: 2025-07-25 | End: 2025-07-25

## 2025-07-25 RX ORDER — SODIUM CHLORIDE, SODIUM LACTATE, POTASSIUM CHLORIDE, CALCIUM CHLORIDE 600; 310; 30; 20 MG/100ML; MG/100ML; MG/100ML; MG/100ML
20 INJECTION, SOLUTION INTRAVENOUS CONTINUOUS
Status: DISCONTINUED | OUTPATIENT
Start: 2025-07-25 | End: 2025-07-26 | Stop reason: HOSPADM

## 2025-07-25 RX ADMIN — PROPOFOL 30 MG: 10 INJECTION, EMULSION INTRAVENOUS at 07:42

## 2025-07-25 RX ADMIN — PROPOFOL 20 MG: 10 INJECTION, EMULSION INTRAVENOUS at 07:43

## 2025-07-25 RX ADMIN — PROPOFOL 10 MG: 10 INJECTION, EMULSION INTRAVENOUS at 07:36

## 2025-07-25 RX ADMIN — PROPOFOL 20 MG: 10 INJECTION, EMULSION INTRAVENOUS at 07:40

## 2025-07-25 RX ADMIN — PROPOFOL 20 MG: 10 INJECTION, EMULSION INTRAVENOUS at 07:33

## 2025-07-25 RX ADMIN — PROPOFOL 10 MG: 10 INJECTION, EMULSION INTRAVENOUS at 07:37

## 2025-07-25 RX ADMIN — PROPOFOL 20 MG: 10 INJECTION, EMULSION INTRAVENOUS at 07:32

## 2025-07-25 RX ADMIN — LIDOCAINE HYDROCHLORIDE 80 MG: 20 INJECTION INTRAVENOUS at 07:28

## 2025-07-25 RX ADMIN — ALBUTEROL SULFATE 2 PUFF: 90 AEROSOL, METERED RESPIRATORY (INHALATION) at 07:12

## 2025-07-25 RX ADMIN — PROPOFOL 80 MG: 10 INJECTION, EMULSION INTRAVENOUS at 07:28

## 2025-07-25 RX ADMIN — PROPOFOL 40 MG: 10 INJECTION, EMULSION INTRAVENOUS at 07:39

## 2025-07-25 RX ADMIN — PROPOFOL 20 MG: 10 INJECTION, EMULSION INTRAVENOUS at 07:34

## 2025-07-25 RX ADMIN — PROPOFOL 10 MG: 10 INJECTION, EMULSION INTRAVENOUS at 07:35

## 2025-07-25 RX ADMIN — PROPOFOL 30 MG: 10 INJECTION, EMULSION INTRAVENOUS at 07:30

## 2025-07-25 RX ADMIN — SODIUM CHLORIDE, POTASSIUM CHLORIDE, SODIUM LACTATE AND CALCIUM CHLORIDE 20 ML/HR: 600; 310; 30; 20 INJECTION, SOLUTION INTRAVENOUS at 06:59

## 2025-07-25 SDOH — HEALTH STABILITY: MENTAL HEALTH: CURRENT SMOKER: 1

## 2025-07-25 ASSESSMENT — COLUMBIA-SUICIDE SEVERITY RATING SCALE - C-SSRS
2. HAVE YOU ACTUALLY HAD ANY THOUGHTS OF KILLING YOURSELF?: NO
6. HAVE YOU EVER DONE ANYTHING, STARTED TO DO ANYTHING, OR PREPARED TO DO ANYTHING TO END YOUR LIFE?: NO
1. IN THE PAST MONTH, HAVE YOU WISHED YOU WERE DEAD OR WISHED YOU COULD GO TO SLEEP AND NOT WAKE UP?: NO

## 2025-07-25 ASSESSMENT — PAIN SCALES - GENERAL
PAINLEVEL_OUTOF10: 0 - NO PAIN
PAIN_LEVEL: 0
PAINLEVEL_OUTOF10: 0 - NO PAIN

## 2025-07-25 ASSESSMENT — PAIN - FUNCTIONAL ASSESSMENT: PAIN_FUNCTIONAL_ASSESSMENT: 0-10

## 2025-07-25 NOTE — ANESTHESIA PREPROCEDURE EVALUATION
Patient: Camacho Thomas    Procedure Information       Date/Time: 07/25/25 0730    Scheduled providers: Melba Molina MD; Skyler Swift MD    Procedure: EGD    Location: [facility] OR            Relevant Problems   Cardiac   (+) CAD (coronary artery disease)   (+) Mitral valve prolapse   (+) NSTEMI (non-ST elevated myocardial infarction) (Multi)      Pulmonary   (+) Lung nodules   (+) Mild chronic obstructive pulmonary disease (Multi)      Neuro   (+) Generalized anxiety disorder      /Renal   (+) BPH (benign prostatic hyperplasia)      Hematology   (+) Anemia       Clinical information reviewed:   Tobacco  Allergies  Meds  Problems  Med Hx  Surg Hx   Fam Hx  Soc   Hx        NPO Detail:  NPO/Void Status  Date of Last Liquid: 07/24/25  Time of Last Liquid: 1900  Date of Last Solid: 07/24/25  Time of Last Solid: 1900         Physical Exam    Airway  Mallampati: I  TM distance: >3 FB  Neck ROM: full  Mouth opening: 3 or more finger widths     Cardiovascular - normal exam   Dental     (+) upper dentures     Pulmonary (+) wheezes     Abdominal - normal exam           Anesthesia Plan    History of general anesthesia?: yes  History of complications of general anesthesia?: no    ASA 2     MAC     The patient is a current smoker.  Patient did not smoke on day of procedure.  Education provided regarding risk of obstructive sleep apnea.  intravenous induction   Anesthetic plan and risks discussed with patient.    Plan discussed with CRNA.

## 2025-07-25 NOTE — ANESTHESIA POSTPROCEDURE EVALUATION
Patient: Camacho Thomas    Procedure Summary       Date: 07/25/25 Room / Location: [facility] OR    Anesthesia Start: 0725 Anesthesia Stop: 0752    Procedure: EGD Diagnosis:       Chest pain, unspecified type      Retrosternal chest pain    Scheduled Providers: Melba Molina MD; Skyler Swift MD Responsible Provider: Skyler Swift MD    Anesthesia Type: MAC ASA Status: 2            Anesthesia Type: MAC    Vitals Value Taken Time   /70 07/25/25 08:05   Temp 36.1 °C (97 °F) 07/25/25 07:50   Pulse 62 07/25/25 08:05   Resp 17 07/25/25 08:05   SpO2 98 % 07/25/25 08:05       Anesthesia Post Evaluation    Patient location during evaluation: PACU  Patient participation: complete - patient participated  Level of consciousness: awake  Pain score: 0  Pain management: adequate  Multimodal analgesia pain management approach  Airway patency: patent  Two or more strategies used to mitigate risk of obstructive sleep apnea  Cardiovascular status: acceptable and stable  Respiratory status: acceptable, room air, airway suctioned and nonlabored ventilation  Hydration status: acceptable  Postoperative Nausea and Vomiting: none        There were no known notable events for this encounter.

## 2025-07-25 NOTE — DISCHARGE INSTRUCTIONS

## 2025-08-01 LAB
LABORATORY COMMENT REPORT: NORMAL
PATH REPORT.FINAL DX SPEC: NORMAL
PATH REPORT.GROSS SPEC: NORMAL
PATH REPORT.MICROSCOPIC SPEC OTHER STN: NORMAL
PATH REPORT.RELEVANT HX SPEC: NORMAL
PATH REPORT.TOTAL CANCER: NORMAL

## 2025-08-12 ENCOUNTER — APPOINTMENT (OUTPATIENT)
Dept: SURGERY | Facility: CLINIC | Age: 60
End: 2025-08-12
Payer: COMMERCIAL

## 2025-08-12 VITALS
BODY MASS INDEX: 22.43 KG/M2 | SYSTOLIC BLOOD PRESSURE: 105 MMHG | OXYGEN SATURATION: 96 % | WEIGHT: 174.7 LBS | HEART RATE: 56 BPM | DIASTOLIC BLOOD PRESSURE: 64 MMHG

## 2025-08-12 DIAGNOSIS — R07.9 CHEST PAIN, UNSPECIFIED TYPE: Primary | ICD-10-CM

## 2025-08-12 PROCEDURE — 99213 OFFICE O/P EST LOW 20 MIN: CPT | Performed by: SURGERY
